# Patient Record
Sex: MALE | Race: WHITE | NOT HISPANIC OR LATINO | Employment: FULL TIME
[De-identification: names, ages, dates, MRNs, and addresses within clinical notes are randomized per-mention and may not be internally consistent; named-entity substitution may affect disease eponyms.]

---

## 2017-02-28 ENCOUNTER — RECORDS - HEALTHEAST (OUTPATIENT)
Dept: ADMINISTRATIVE | Facility: OTHER | Age: 37
End: 2017-02-28

## 2018-02-05 ENCOUNTER — COMMUNICATION - HEALTHEAST (OUTPATIENT)
Dept: TELEHEALTH | Facility: CLINIC | Age: 38
End: 2018-02-05

## 2018-02-05 ENCOUNTER — COMMUNICATION - HEALTHEAST (OUTPATIENT)
Dept: FAMILY MEDICINE | Facility: CLINIC | Age: 38
End: 2018-02-05

## 2018-02-05 ENCOUNTER — OFFICE VISIT - HEALTHEAST (OUTPATIENT)
Dept: FAMILY MEDICINE | Facility: CLINIC | Age: 38
End: 2018-02-05

## 2018-02-05 DIAGNOSIS — Z00.00 ANNUAL PHYSICAL EXAM: ICD-10-CM

## 2018-02-05 DIAGNOSIS — D22.9 NUMEROUS MOLES: ICD-10-CM

## 2018-02-05 DIAGNOSIS — Z76.89 ENCOUNTER TO ESTABLISH CARE: ICD-10-CM

## 2018-02-05 LAB
ALBUMIN SERPL-MCNC: 4 G/DL (ref 3.5–5)
ALP SERPL-CCNC: 76 U/L (ref 45–120)
ALT SERPL W P-5'-P-CCNC: 15 U/L (ref 0–45)
ANION GAP SERPL CALCULATED.3IONS-SCNC: 8 MMOL/L (ref 5–18)
AST SERPL W P-5'-P-CCNC: 18 U/L (ref 0–40)
BILIRUB SERPL-MCNC: 0.7 MG/DL (ref 0–1)
BUN SERPL-MCNC: 12 MG/DL (ref 8–22)
CALCIUM SERPL-MCNC: 9.4 MG/DL (ref 8.5–10.5)
CHLORIDE BLD-SCNC: 103 MMOL/L (ref 98–107)
CHOLEST SERPL-MCNC: 263 MG/DL
CO2 SERPL-SCNC: 29 MMOL/L (ref 22–31)
CREAT SERPL-MCNC: 0.8 MG/DL (ref 0.7–1.3)
FASTING STATUS PATIENT QL REPORTED: YES
GFR SERPL CREATININE-BSD FRML MDRD: >60 ML/MIN/1.73M2
GLUCOSE BLD-MCNC: 78 MG/DL (ref 70–125)
HDLC SERPL-MCNC: 57 MG/DL
LDLC SERPL CALC-MCNC: 186 MG/DL
POTASSIUM BLD-SCNC: 4.4 MMOL/L (ref 3.5–5)
PROT SERPL-MCNC: 7.5 G/DL (ref 6–8)
SODIUM SERPL-SCNC: 140 MMOL/L (ref 136–145)
TRIGL SERPL-MCNC: 98 MG/DL
TSH SERPL DL<=0.005 MIU/L-ACNC: 1.25 UIU/ML (ref 0.3–5)

## 2018-02-05 ASSESSMENT — MIFFLIN-ST. JEOR: SCORE: 1750.2

## 2018-10-10 ENCOUNTER — COMMUNICATION - HEALTHEAST (OUTPATIENT)
Dept: FAMILY MEDICINE | Facility: CLINIC | Age: 38
End: 2018-10-10

## 2018-10-11 ENCOUNTER — OFFICE VISIT - HEALTHEAST (OUTPATIENT)
Dept: FAMILY MEDICINE | Facility: CLINIC | Age: 38
End: 2018-10-11

## 2018-10-11 DIAGNOSIS — R07.89 ATYPICAL CHEST PAIN: ICD-10-CM

## 2018-10-11 LAB
ALBUMIN SERPL-MCNC: 4.4 G/DL (ref 3.5–5)
ALP SERPL-CCNC: 89 U/L (ref 45–120)
ALT SERPL W P-5'-P-CCNC: 24 U/L (ref 0–45)
ANION GAP SERPL CALCULATED.3IONS-SCNC: 11 MMOL/L (ref 5–18)
AST SERPL W P-5'-P-CCNC: 24 U/L (ref 0–40)
ATRIAL RATE - MUSE: 57 BPM
BILIRUB SERPL-MCNC: 1.3 MG/DL (ref 0–1)
BUN SERPL-MCNC: 9 MG/DL (ref 8–22)
CALCIUM SERPL-MCNC: 10.4 MG/DL (ref 8.5–10.5)
CHLORIDE BLD-SCNC: 102 MMOL/L (ref 98–107)
CO2 SERPL-SCNC: 27 MMOL/L (ref 22–31)
CREAT SERPL-MCNC: 0.91 MG/DL (ref 0.7–1.3)
DIASTOLIC BLOOD PRESSURE - MUSE: NORMAL MMHG
GFR SERPL CREATININE-BSD FRML MDRD: >60 ML/MIN/1.73M2
GLUCOSE BLD-MCNC: 90 MG/DL (ref 70–125)
INTERPRETATION ECG - MUSE: NORMAL
MAGNESIUM SERPL-MCNC: 2.4 MG/DL (ref 1.8–2.6)
P AXIS - MUSE: 11 DEGREES
POTASSIUM BLD-SCNC: 4.2 MMOL/L (ref 3.5–5)
PR INTERVAL - MUSE: 176 MS
PROT SERPL-MCNC: 7.8 G/DL (ref 6–8)
QRS DURATION - MUSE: 98 MS
QT - MUSE: 404 MS
QTC - MUSE: 393 MS
R AXIS - MUSE: 39 DEGREES
SODIUM SERPL-SCNC: 140 MMOL/L (ref 136–145)
SYSTOLIC BLOOD PRESSURE - MUSE: NORMAL MMHG
T AXIS - MUSE: 43 DEGREES
VENTRICULAR RATE- MUSE: 57 BPM

## 2018-10-12 ENCOUNTER — COMMUNICATION - HEALTHEAST (OUTPATIENT)
Dept: FAMILY MEDICINE | Facility: CLINIC | Age: 38
End: 2018-10-12

## 2018-10-12 LAB
25(OH)D3 SERPL-MCNC: 14.9 NG/ML (ref 30–80)
25(OH)D3 SERPL-MCNC: 14.9 NG/ML (ref 30–80)

## 2018-10-15 ENCOUNTER — AMBULATORY - HEALTHEAST (OUTPATIENT)
Dept: FAMILY MEDICINE | Facility: CLINIC | Age: 38
End: 2018-10-15

## 2018-10-15 DIAGNOSIS — E55.9 VITAMIN D INSUFFICIENCY: ICD-10-CM

## 2019-01-12 ENCOUNTER — COMMUNICATION - HEALTHEAST (OUTPATIENT)
Dept: FAMILY MEDICINE | Facility: CLINIC | Age: 39
End: 2019-01-12

## 2019-01-16 ENCOUNTER — OFFICE VISIT - HEALTHEAST (OUTPATIENT)
Dept: FAMILY MEDICINE | Facility: CLINIC | Age: 39
End: 2019-01-16

## 2019-01-16 DIAGNOSIS — F41.9 ANXIETY: ICD-10-CM

## 2019-01-16 DIAGNOSIS — Z00.00 HEALTHCARE MAINTENANCE: ICD-10-CM

## 2019-01-16 DIAGNOSIS — E55.9 VITAMIN D INSUFFICIENCY: ICD-10-CM

## 2019-01-16 LAB
CHOLEST SERPL-MCNC: 301 MG/DL
FASTING STATUS PATIENT QL REPORTED: YES
HDLC SERPL-MCNC: 52 MG/DL
LDLC SERPL CALC-MCNC: 232 MG/DL
TRIGL SERPL-MCNC: 84 MG/DL
TSH SERPL DL<=0.005 MIU/L-ACNC: 0.76 UIU/ML (ref 0.3–5)

## 2019-01-17 LAB
25(OH)D3 SERPL-MCNC: 33.6 NG/ML (ref 30–80)
25(OH)D3 SERPL-MCNC: 33.6 NG/ML (ref 30–80)

## 2019-03-12 ENCOUNTER — OFFICE VISIT - HEALTHEAST (OUTPATIENT)
Dept: FAMILY MEDICINE | Facility: CLINIC | Age: 39
End: 2019-03-12

## 2019-03-12 DIAGNOSIS — Z23 INFLUENZA VACCINE NEEDED: ICD-10-CM

## 2019-03-12 DIAGNOSIS — F41.9 ANXIETY: ICD-10-CM

## 2020-05-05 ENCOUNTER — RECORDS - HEALTHEAST (OUTPATIENT)
Dept: ADMINISTRATIVE | Facility: OTHER | Age: 40
End: 2020-05-05

## 2020-06-26 ENCOUNTER — RECORDS - HEALTHEAST (OUTPATIENT)
Dept: ADMINISTRATIVE | Facility: OTHER | Age: 40
End: 2020-06-26

## 2020-07-28 ENCOUNTER — OFFICE VISIT - HEALTHEAST (OUTPATIENT)
Dept: FAMILY MEDICINE | Facility: CLINIC | Age: 40
End: 2020-07-28

## 2020-07-28 DIAGNOSIS — Z00.00 ENCOUNTER FOR GENERAL ADULT MEDICAL EXAMINATION WITHOUT ABNORMAL FINDINGS: ICD-10-CM

## 2020-07-28 DIAGNOSIS — Z13.220 ENCOUNTER FOR SCREENING FOR LIPOID DISORDERS: ICD-10-CM

## 2020-07-28 LAB
ANION GAP SERPL CALCULATED.3IONS-SCNC: 12 MMOL/L (ref 5–18)
BUN SERPL-MCNC: 12 MG/DL (ref 8–22)
CALCIUM SERPL-MCNC: 9.6 MG/DL (ref 8.5–10.5)
CHLORIDE BLD-SCNC: 103 MMOL/L (ref 98–107)
CHOLEST SERPL-MCNC: 274 MG/DL
CO2 SERPL-SCNC: 25 MMOL/L (ref 22–31)
CREAT SERPL-MCNC: 0.91 MG/DL (ref 0.7–1.3)
FASTING STATUS PATIENT QL REPORTED: YES
GFR SERPL CREATININE-BSD FRML MDRD: >60 ML/MIN/1.73M2
GLUCOSE BLD-MCNC: 80 MG/DL (ref 70–125)
HDLC SERPL-MCNC: 60 MG/DL
HGB BLD-MCNC: 14.5 G/DL (ref 14–18)
HIV 1+2 AB+HIV1 P24 AG SERPL QL IA: NEGATIVE
LDLC SERPL CALC-MCNC: 198 MG/DL
POTASSIUM BLD-SCNC: 3.8 MMOL/L (ref 3.5–5)
SODIUM SERPL-SCNC: 140 MMOL/L (ref 136–145)
TRIGL SERPL-MCNC: 78 MG/DL

## 2020-07-28 ASSESSMENT — MIFFLIN-ST. JEOR: SCORE: 1747.7

## 2020-07-29 LAB
25(OH)D3 SERPL-MCNC: 26.7 NG/ML (ref 30–80)
25(OH)D3 SERPL-MCNC: 26.7 NG/ML (ref 30–80)

## 2020-11-03 ENCOUNTER — RECORDS - HEALTHEAST (OUTPATIENT)
Dept: ADMINISTRATIVE | Facility: OTHER | Age: 40
End: 2020-11-03

## 2021-05-31 VITALS — WEIGHT: 174 LBS | BODY MASS INDEX: 23.06 KG/M2 | HEIGHT: 73 IN

## 2021-06-01 ENCOUNTER — RECORDS - HEALTHEAST (OUTPATIENT)
Dept: ADMINISTRATIVE | Facility: CLINIC | Age: 41
End: 2021-06-01

## 2021-06-01 VITALS — BODY MASS INDEX: 23.54 KG/M2 | WEIGHT: 176 LBS

## 2021-06-02 ENCOUNTER — RECORDS - HEALTHEAST (OUTPATIENT)
Dept: ADMINISTRATIVE | Facility: CLINIC | Age: 41
End: 2021-06-02

## 2021-06-02 VITALS — BODY MASS INDEX: 23.2 KG/M2 | WEIGHT: 173.44 LBS

## 2021-06-02 VITALS — BODY MASS INDEX: 23.54 KG/M2 | WEIGHT: 176 LBS

## 2021-06-04 VITALS
HEART RATE: 58 BPM | DIASTOLIC BLOOD PRESSURE: 84 MMHG | OXYGEN SATURATION: 96 % | WEIGHT: 175.2 LBS | SYSTOLIC BLOOD PRESSURE: 128 MMHG | HEIGHT: 72 IN | BODY MASS INDEX: 23.73 KG/M2

## 2021-06-10 NOTE — PROGRESS NOTES
MALE PREVENTATIVE EXAM    Assessment and Plan:   1. Encounter for general adult medical examination without abnormal findings  Healthy male exam  - Lipid Cascade- RANDOM  - HIV Antigen/Antibody Screening Cook  - Basic Metabolic Panel  - Hemoglobin  - Vitamin D, Total (25-Hydroxy)  - Tdap vaccine greater than or equal to 6yo IM    2. Encounter for screening for lipoid disorders  - Lipid Cascade- RANDOM    Next follow up:  No follow-ups on file.    Immunization Review  Adult Imm Review: Due today, orders placed    I discussed the following with the patient:   Adult Healthy Living: Importance of regular exercise  Healthy nutrition  Getting adequate sleep  Stress management  Use of seat belts  Helmets  Sporting equipment safety  STI prevention  Contraception options  Supplement use  Herbal medications/alternative medical therapies    I have had an Advance Directives discussion with the patient.    Subjective:   Chief Complaint: Eyad Venegas is an 40 y.o. male here for a preventative health visit.     HPI: Patient has no concerns today.  He did have a stye on his right eye which he is taking antibiotic for and its resolving.  Patient denies chest pain, shortness of breath, fever chest pain.    Healthy Habits  Are you taking a daily aspirin? No  Do you typically exercising at least 40 min, 3-4 times per week?  NO  Do you usually eat at least 4 servings of fruit and vegetables a day, include whole grains and fiber and avoid regularly eating high fat foods? Yes  Have you had an eye exam in the past two years? Yes  Do you see a dentist twice per year? Yes  Do you have any concerns regarding sleep? No    Safety Screen  If you own firearms, are they secured in a locked gun cabinet or with trigger locks? The patient does not own any firearms  Do you feel you are safe where you are living?: Yes (7/28/2020  3:10 PM)  Do you feel you are safe in your relationship(s)?: Yes (7/28/2020  3:10 PM)      Review of Systems:   Please see above.  The rest of the review of systems are negative for all systems.     Cancer Screening     Patient has no health maintenance due at this time          Patient Care Team:  Tonya Lara FNP as PCP - General (Nurse Practitioner)  Tonya Lara FNP as Assigned PCP        History     Reviewed By Date/Time Sections Reviewed    Christo Sanders CMA 7/28/2020  3:13 PM Tobacco            Objective:   Vital Signs:   Visit Vitals  /84   Pulse (!) 58   Ht 6' (1.829 m)   Wt 175 lb 3.2 oz (79.5 kg)   SpO2 96%   BMI 23.76 kg/m           PHYSICAL EXAM  /84   Pulse (!) 58   Ht 6' (1.829 m)   Wt 175 lb 3.2 oz (79.5 kg)   SpO2 96%   BMI 23.76 kg/m    General appearance: alert, appears stated age and cooperative  Head: Normocephalic, without obvious abnormality, atraumatic  Eyes: conjunctivae/corneas clear. PERRL, EOM's intact. Fundi benign.  Ears: normal TM's and external ear canals both ears  Throat: lips, mucosa, and tongue normal; teeth and gums normal  Neck: no adenopathy, no carotid bruit, no JVD, supple, symmetrical, trachea midline and thyroid not enlarged, symmetric, no tenderness/mass/nodules  Back: symmetric, no curvature. ROM normal. No CVA tenderness.  Lungs: clear to auscultation bilaterally  Chest wall: no tenderness  Heart: regular rate and rhythm, S1, S2 normal, no murmur, click, rub or gallop  Abdomen: soft, non-tender; bowel sounds normal; no masses,  no organomegaly  Extremities: extremities normal, atraumatic, no cyanosis or edema  Pulses: 2+ and symmetric  Skin: Skin color, texture, turgor normal. No rashes or lesions  Lymph nodes: Cervical, supraclavicular, and axillary nodes normal.  Neurologic: Grossly normal             Medication List      as of July 28, 2020  3:48 PM     You have not been prescribed any medications.         Additional Screenings Completed Today:

## 2021-06-15 NOTE — PROGRESS NOTES
Assessment:   1. Encounter to establish care  2. Annual physical exam  Healthy male exam. Encourage patient to start a priority list in the morning and work from the most important and try to complete a task before starting the next one. Patient verbalized understanding and promised to give it a try. He was also concerned of his blood pressure. Blood pressure was normal today. Patient was encouraged to get a blood pressure machine at home and check his blood pressure twice daily and if blood pressure is elevated he should return to the clinic with result.   - Lipid [place] FASTING  - Comprehensive Metabolic Panel  - Thyroid Cascade    3. Numerous moles  Patient sees a dermatologist annual for skin check and mole removal.      Plan:   All questions answered.  Blood tests: Comprehensive metabolic panel and Total cholesterol.  Discussed healthy lifestyle modifications.  Follow up as needed.     Subjective:   Eyad Venegas is a 37 y.o. male who presents for an annual exam. The patient reports that there is not domestic violence in his life. 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 He reports that he starts some of the task but he is unable to finish it as he moves to different things. He states that this was not like this before so he thought he was loosing his mind or if something was happening to his memory. He denied any other symptoms. Both his recent and past memories were intact.     Healthy Habits:   Regular Exercise: Yes  Sunscreen Use: Yes  Healthy Diet: Yes  Dental Visits Regularly: No  Seat Belt: Yes  Sexually active: Yes  Monthly Self Testicular Exams:  No  Hemoccults: N/A  Flex Sig: N/A  Colonoscopy: N/A  Lipid Profile: N/A  Glucose Screen: N/A  Prevention of Osteoporosis: N/A  Last Dexa: N/A  Guns at Home:  No      Immunization History   Administered Date(s) Administered     Hep A, historic 10/27/2014     Influenza,  "seasonal,quad inj 6-35 mos 10/27/2014     Td,adult,historic,unspecified 11/30/2005     Tdap 12/01/2009     Typhoid, historic, Unspecified 10/27/2014     Immunization status: up to date and documented, stated as current, but no records available.    No exam data present    No current outpatient prescriptions on file.     No current facility-administered medications for this visit.      No past medical history on file.  Past Surgical History:   Procedure Laterality Date     WI EXPLORE UNDESC TESTIS,INGUIN/SCROTAL      Description: Surgery Testis Exploration Of Undescended Testis;  Recorded: 07/27/2010;  Comments: thinks R-when very young     Review of patient's allergies indicates no known allergies.  No family history on file.  Social History     Social History     Marital status: Single     Spouse name: N/A     Number of children: N/A     Years of education: N/A     Occupational History     Not on file.     Social History Main Topics     Smoking status: Never Smoker     Smokeless tobacco: Never Used     Alcohol use Not on file     Drug use: Not on file     Sexual activity: Not on file     Other Topics Concern     Not on file     Social History Narrative       Review of Systems  General:  Denies problem  Eyes: Denies problem  Ears/Nose/Throat: Denies problem  Cardiovascular: Denies problem  Respiratory:  Denies problem  Gastrointestinal:  Denies problem  Genitourinary: Denies problem  Musculoskeletal:  Denies problem  Skin: Denies problem  Neurologic: Denies problem  Psychiatric: Denies problem  Endocrine: Denies problem  Heme/Lymphatic: Denies problem   Allergic/Immunologic: Denies problem        Objective:     Vitals:    02/05/18 0827   BP: 118/76   Pulse: 74   SpO2: 96%   Weight: 174 lb (78.9 kg)   Height: 6' 0.5\" (1.842 m)     Body mass index is 23.27 kg/(m^2).    Physical  General Appearance: Alert, cooperative, no distress, appears stated age  Head: Normocephalic, without obvious abnormality, atraumatic  Eyes: " PERRL, conjunctiva/corneas clear, EOM's intact  Ears: Normal TM's and external ear canals, both ears  Nose: Nares normal, septum midline,mucosa normal, no drainage  Throat: Lips, mucosa, and tongue normal; teeth and gums normal  Neck: Supple, symmetrical, trachea midline, no adenopathy;  thyroid: not enlarged, symmetric, no tenderness/mass/nodules; no carotid bruit or JVD  Back: Symmetric, no curvature, ROM normal, no CVA tenderness  Lungs: Clear to auscultation bilaterally, respirations unlabored  Heart: Regular rate and rhythm, S1 and S2 normal, no murmur, rub, or gallop,  Abdomen: Soft, non-tender, bowel sounds active all four quadrants,  no masses, no organomegaly  Musculoskeletal: Normal range of motion. No joint swelling or deformity.   Extremities: Extremities normal, atraumatic, no cyanosis or edema  Skin: Skin color, texture, turgor normal, no rashes or lesions  Lymph nodes: Cervical, supraclavicular, and axillary nodes normal  Neurologic: He is alert. He has normal reflexes.   Psychiatric: He has a normal mood and affect.

## 2021-06-18 NOTE — PATIENT INSTRUCTIONS - HE
Patient Instructions by Tonya Lara FNP at 7/28/2020  3:20 PM     Author: Tonya Lara FNP Service: -- Author Type: Nurse Practitioner    Filed: 7/28/2020  3:35 PM Encounter Date: 7/28/2020 Status: Signed    : Tonya Lara FNP (Nurse Practitioner)           Preventing Skin Cancer     Use sunscreen of SPF 30 or greater. Apply liberally.   Relaxing in the sun may feel good. But it isnt good for your skin. In fact, the suns harmful rays are the major cause of skin cancer. This is a serious disease that can be life-threatening. People of all ages, races, and backgrounds are at risk.  Skin cancer is the most common cancer in the [place] But in most cases, it can be prevented.  Your role in prevention  You can act today to help prevent skin cancer. Start by avoiding the suns UV (ultraviolet) rays. And dont use tanning beds or lamps. They are no safer than the sun. Taking these steps can help keep you from getting skin cancer. It can also help prevent wrinkles and other aging effects caused by the sun. Make sure your children also follow these safeguards. Now is the time to start taking steps to prevent skin cancer.  When you are outdoors  Protect your skin when you go out during the day. Take safety steps whenever you go out to eat, run errands by car or on foot, or do any outdoor activity. There isnt just one easy way to protect your skin. Its best to follow all of these steps:    Wear tightly woven clothing that covers your skin. Put on a wide-brimmed hat to protect your face, ears, and scalp.    Watch the clock. Try to stay out of the sun between 10 a.m. and 4 p.m. That's when the sun's rays are strongest.    Head for the shade or create your own. Use an umbrella when sitting or strolling.    Know that the suns rays can reflect off sand, water, and snow. This can harm your skin. Take extra care when you are near reflective surfaces.    Keep in mind that even when the weather is hazy or cloudy,  "your skin can be exposed to strong UV rays.    Shield your skin with sunscreen. Also use sunscreen on your childrens skin. Keep babies younger than 6 months old out of the sun.  Tips for using sunscreen  To help prevent skin cancer, choose the right sunscreen and use it correctly. Try these tips:    Choose a sunscreen that has an SPF (sun protection factor) of at least 30. Also choose a sunscreen labeled \"broad spectrum. This will protect you from both UVA and UVB (ultraviolet A and B) rays.    If one brand irritates your skin, try another, such as one without fragrance.    Use a water-resistant sunscreen if you swim or sweat.    Use at least 1 ounce of sunscreen to cover exposed areas. This is enough to fill a shot glass. You might need to adjust the amount depending on your body size.    Put the sunscreen on dry skin about 15 minutes before going outdoors. This gives it time to soak in.    Reapply sunscreen every 2 hours. If youre active, do this more often.    Cover any sun-exposed skin, from your face to your feet. Dont forget your scalp, ears, and lips.    Know that while sunscreen helps protect you, it isnt enough. Sunscreens extend the length of time you can be outdoors before your skin starts to get red. But they don't give you total protection. Using sunscreen doesn't mean you can stay out in the sun for an unlimited time. Your skin cells are still being damaged. You should also wear protective clothing. And try to stay out of the sun as much as you can, especially from 10 a.m. to 4 p.m.  Date Last Reviewed: 7/1/2019 2000-2019 [employer]. [address] [place] [address], [place] [address]. All rights reserved. This information is not intended as a substitute for professional medical care. Always follow your healthcare professional's instructions.             "

## 2021-06-20 NOTE — PROGRESS NOTES
Assessment:   1. Atypical chest pain  I suspect Costochondritis. I recommend that patient stop weight lifting, use hot compresses and follow up in 2 weeks. We will get EKG to rule out cardiac etiology.   - Electrocardiogram Perform and Read  - Comprehensive Metabolic Panel  - Magnesium  - Vitamin D, Total (25-Hydroxy)    Plan:   Chest wall injuries were discussed.  The sometimes prolonged recovery time was stressed.  OTC analgesics as needed.  Follow up as needed     Subjective:   Eyad Venegas is a 38 y.o. male who presents for evaluation of chest wall pain. Onset was 4 weeks ago. Symptoms have been unchanged since that time. The patient describes the pain as sharp and shooting in the costochondral region:  on the left and anterior chest wall: on the left. Patient rates pain as a 3/10 in intensity. Associated symptoms are: none. Aggravating factors are: none. Alleviating factors are: none. Mechanism of injury: none known. Previous visits for this problem: none. Evaluation to date: none. Treatment to date: none.    The following portions of the patient's history were reviewed and updated as appropriate: allergies, current medications, past family history, past medical history, past social history, past surgical history and problem list.    Review of Systems  Pertinent items are noted in HPI.     Objective:      /82 (Patient Site: Left Arm, Patient Position: Sitting, Cuff Size: Adult Regular)  Pulse 80  Wt 176 lb (79.8 kg)  SpO2 98%  BMI 23.54 kg/m2  General appearance: alert, appears stated age and cooperative  Head: Normocephalic, without obvious abnormality, atraumatic  Throat: lips, mucosa, and tongue normal; teeth and gums normal  Neck: no adenopathy, no carotid bruit, no JVD, supple, symmetrical, trachea midline and thyroid not enlarged, symmetric, no tenderness/mass/nodules  Lungs: clear to auscultation bilaterally  Chest wall: no tenderness, left sided chest wall tenderness  Heart: regular  rate and rhythm, S1, S2 normal, no murmur, click, rub or gallop  Pulses: 2+ and symmetric  Skin: Skin color, texture, turgor normal. No rashes or lesions  Lymph nodes: Cervical, supraclavicular, and axillary nodes normal.  Neurologic: Grossly normal    EKG: Sinus bradycardia.

## 2021-06-23 NOTE — TELEPHONE ENCOUNTER
RN cannot approve Refill Request    RN can NOT refill this medication med is not covered by policy/route to provider. Last office visit: 10/11/2018 Tonya Lara FNP Last Physical: 2/5/2018 Last MTM visit: Visit date not found Last visit same specialty: 10/11/2018 Tonya aLra FNP.  Next visit within 3 mo: Visit date not found  Next physical within 3 mo: Visit date not found      Silvina Shipman, [facility] Triage/Med Refill 1/13/2019    Requested Prescriptions   Pending Prescriptions Disp Refills     ergocalciferol (ERGOCALCIFEROL) 50,000 unit capsule [Pharmacy Med Name: VIT D2 1.25 MG (50,000 UNIT)] 12 capsule 0     Sig: TAKE ONE CAPSULE BY MOUTH ONE TIME PER WEEK    There is no refill protocol information for this order

## 2021-06-23 NOTE — PROGRESS NOTES
Office Visit - Follow Up   Eyad Venegas   38 y.o. male    Date of Visit: 1/16/2019    Chief Complaint   Patient presents with     Follow-up     fasting        Assessment and Plan   1. Vitamin D insufficiency  Plan to recheck vitamin D levels today and reassess.  - Vitamin D, Total (25-Hydroxy)    2. Anxiety  PHQ 9 score of 10 and KALI 7 score of 16.  Discussed the pathophysiology of anxiety and depression and available treatment including counseling and pharmacology.  Patient opted for psychotherapy at this time and will follow-up in 4 weeks for reassessment.  Medications: none.  Labs: Comprehensive metabolic profile and TSH.  Recommended counseling.  Reviewed concept of anxiety as biochemical imbalance of neurotransmitters and rationale for treatment.  Instructed patient to contact office or on-call physician promptly should condition worsen or any new symptoms appear and provided on-call telephone numbers. IF THE PATIENT HAS ANY SUICIDAL OR HOMICIDAL IDEATIONS, CALL THE OFFICE, DISCUSS WITH A SUPPORT MEMBER, OR GO TO THE ER IMMEDIATELY. Patient was agreeable with this plan.  Follow up: 4 weeks.  Spent 25 minutes (>50% of visit) discussing the risks of anxiety disorder, the  pathophysiology, etiology, risks, and principles of treatment.     - Ambulatory referral to Psychology    3. Healthcare maintenance  - Thyroid Stimulating Hormone (TSH)  - Lipid Cascade    The following high BMI interventions were performed this visit: encouragement to exercise and dietary management education, guidance, and counseling    No Follow-up on file.     History of Present Illness   This 38 y.o. old male patient who presents to the clinic for follow-up of his vitamin D level.  Patient also reported that he is noticed that he gets very anxious lately and he is not sure if its because he is unable to complete his tasks at home and at work. Current symptoms: difficulty concentrating, irritable, palpitations, sweating. He denies  current suicidal and homicidal ideation. He did report family history of anxiety.  He denies any suicidal homicidal ideation.     Review of Systems: A comprehensive review of systems was negative except as noted.     Medications, Allergies and Problem List   Reviewed and updated     Physical Exam   General Appearance: Well groomed    /76 (Patient Site: Right Arm, Patient Position: Sitting, Cuff Size: Adult Regular)   Pulse (!) 56   Wt 176 lb (79.8 kg)   SpO2 96%   BMI 23.54 kg/m     Psych: appropriate affective, answers all of my questions appropriately. No hallucinations, delusion, or suicidal ideations.  Affect/Behavior:  full facial expressions, good grooming, good insight, normal perception, normal reasoning, normal speech pattern and content and normal thought patterns           Additional Information   Current Outpatient Medications   Medication Sig Dispense Refill     ergocalciferol (VITAMIN D2) 50,000 unit capsule Take 1 capsule (50,000 Units total) by mouth every 7 days. 12 capsule 0     No current facility-administered medications for this visit.      No Known Allergies  Social History     Tobacco Use     Smoking status: Never Smoker     Smokeless tobacco: Never Used   Substance Use Topics     Alcohol use: Yes     Alcohol/week: 3.0 oz     Types: 5 Cans of beer per week     Drug use: Yes     Types: Marijuana     Comment: Once a month       Time: total time spent with the patient was 25 minutes of which >50% was spent in counseling and coordination of care     Tonya Lara, CNP

## 2021-06-24 NOTE — PROGRESS NOTES
Assessment:   1. Anxiety  Improved symptoms with adjustment at work at home.  Patient will continue to work on his symptoms and hopefully establish with a psychologist for therapy.    2. Influenza vaccine needed  Complete needed vaccination.     Plan:   Medications: None.  Reviewed concept of anxiety as biochemical imbalance of neurotransmitters and rationale for treatment.  Instructed patient to contact office or on-call physician promptly should condition worsen or any new symptoms appear and provided on-call telephone numbers. IF THE PATIENT HAS ANY SUICIDAL OR HOMICIDAL IDEATIONS, CALL THE OFFICE, DISCUSS WITH A SUPPORT MEMBER, OR GO TO THE ER IMMEDIATELY. Patient was agreeable with this plan.  Follow up: 3 months.  Spent 15 minutes (>50% of visit) discussing the risks of anxiety disorder, the  pathophysiology, etiology, risks, and principles of treatment.     Subjective:   Eyad MORALES Rikgary is a 38 y.o. male who presents for follow up of anxiety disorder.  Patient reported that he has been doing better.  He reported that he made some adjustment at his work and that it has really helped with his anxiety.  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  He stated that he is expecting a baby in October and he is feeling much better about the whole situation.  He is yet to establish with a psychologist for therapy.  Current symptoms: feelings of losing control. He denies current suicidal and homicidal ideation.     The following portions of the patient's history were reviewed and updated as appropriate: allergies, current medications, past family history, past medical history, past social history, past surgical history and problem list.      Objective:      /66   Pulse (!) 55   Wt 173 lb 7 oz (78.7 kg)   SpO2 99%   BMI 23.20 kg/m     General:  alert, appears stated age and cooperative    Affect/Behavior:  full facial expressions, good grooming, good insight, normal perception, normal reasoning, normal speech pattern and content and normal thought patterns

## 2021-08-06 ENCOUNTER — TRANSFERRED RECORDS (OUTPATIENT)
Dept: HEALTH INFORMATION MANAGEMENT | Facility: CLINIC | Age: 41
End: 2021-08-06

## 2021-09-19 ENCOUNTER — HEALTH MAINTENANCE LETTER (OUTPATIENT)
Age: 41
End: 2021-09-19

## 2021-12-08 ENCOUNTER — NURSE TRIAGE (OUTPATIENT)
Dept: NURSING | Facility: CLINIC | Age: 41
End: 2021-12-08
Payer: COMMERCIAL

## 2021-12-09 ENCOUNTER — OFFICE VISIT (OUTPATIENT)
Dept: FAMILY MEDICINE | Facility: CLINIC | Age: 41
End: 2021-12-09
Payer: COMMERCIAL

## 2021-12-09 VITALS
BODY MASS INDEX: 24.82 KG/M2 | OXYGEN SATURATION: 98 % | HEART RATE: 89 BPM | SYSTOLIC BLOOD PRESSURE: 130 MMHG | WEIGHT: 183 LBS | DIASTOLIC BLOOD PRESSURE: 86 MMHG

## 2021-12-09 DIAGNOSIS — R03.0 ELEVATED BLOOD PRESSURE READING WITHOUT DIAGNOSIS OF HYPERTENSION: Primary | ICD-10-CM

## 2021-12-09 LAB
ALBUMIN SERPL-MCNC: 3.8 G/DL (ref 3.4–5)
ALP SERPL-CCNC: 78 U/L (ref 40–150)
ALT SERPL W P-5'-P-CCNC: 32 U/L (ref 0–70)
ANION GAP SERPL CALCULATED.3IONS-SCNC: 5 MMOL/L (ref 3–14)
AST SERPL W P-5'-P-CCNC: 22 U/L (ref 0–45)
BILIRUB SERPL-MCNC: 0.6 MG/DL (ref 0.2–1.3)
BUN SERPL-MCNC: 15 MG/DL (ref 7–30)
CALCIUM SERPL-MCNC: 9.5 MG/DL (ref 8.5–10.1)
CHLORIDE BLD-SCNC: 103 MMOL/L (ref 94–109)
CO2 SERPL-SCNC: 28 MMOL/L (ref 20–32)
CREAT SERPL-MCNC: 1.01 MG/DL (ref 0.66–1.25)
ERYTHROCYTE [DISTWIDTH] IN BLOOD BY AUTOMATED COUNT: 12 % (ref 10–15)
GFR SERPL CREATININE-BSD FRML MDRD: >90 ML/MIN/1.73M2
GLUCOSE BLD-MCNC: 95 MG/DL (ref 70–99)
HCT VFR BLD AUTO: 41.5 % (ref 40–53)
HGB BLD-MCNC: 14.3 G/DL (ref 13.3–17.7)
MCH RBC QN AUTO: 30.6 PG (ref 26.5–33)
MCHC RBC AUTO-ENTMCNC: 34.5 G/DL (ref 31.5–36.5)
MCV RBC AUTO: 89 FL (ref 78–100)
PLATELET # BLD AUTO: 360 10E3/UL (ref 150–450)
POTASSIUM BLD-SCNC: 3.9 MMOL/L (ref 3.4–5.3)
PROT SERPL-MCNC: 8.4 G/DL (ref 6.8–8.8)
RBC # BLD AUTO: 4.68 10E6/UL (ref 4.4–5.9)
SODIUM SERPL-SCNC: 136 MMOL/L (ref 133–144)
TSH SERPL DL<=0.005 MIU/L-ACNC: 1.03 MU/L (ref 0.4–4)
WBC # BLD AUTO: 5.1 10E3/UL (ref 4–11)

## 2021-12-09 PROCEDURE — 80053 COMPREHEN METABOLIC PANEL: CPT | Performed by: STUDENT IN AN ORGANIZED HEALTH CARE EDUCATION/TRAINING PROGRAM

## 2021-12-09 PROCEDURE — 84443 ASSAY THYROID STIM HORMONE: CPT | Performed by: STUDENT IN AN ORGANIZED HEALTH CARE EDUCATION/TRAINING PROGRAM

## 2021-12-09 PROCEDURE — 99203 OFFICE O/P NEW LOW 30 MIN: CPT | Performed by: STUDENT IN AN ORGANIZED HEALTH CARE EDUCATION/TRAINING PROGRAM

## 2021-12-09 PROCEDURE — 80061 LIPID PANEL: CPT | Performed by: STUDENT IN AN ORGANIZED HEALTH CARE EDUCATION/TRAINING PROGRAM

## 2021-12-09 PROCEDURE — 82043 UR ALBUMIN QUANTITATIVE: CPT | Performed by: STUDENT IN AN ORGANIZED HEALTH CARE EDUCATION/TRAINING PROGRAM

## 2021-12-09 PROCEDURE — 36415 COLL VENOUS BLD VENIPUNCTURE: CPT | Performed by: STUDENT IN AN ORGANIZED HEALTH CARE EDUCATION/TRAINING PROGRAM

## 2021-12-09 PROCEDURE — 85027 COMPLETE CBC AUTOMATED: CPT | Performed by: STUDENT IN AN ORGANIZED HEALTH CARE EDUCATION/TRAINING PROGRAM

## 2021-12-09 NOTE — PATIENT INSTRUCTIONS
[email or web address]    Check home blood pressures. Check twice per day at various times during the day. Check after resting for 5-10 minutes.     BP stages (according to ACC):  <120 and <80 normal  120-130 and <80 is elevated  130-140 or 80-90 is Stage I HTN, treatment is lifestyle changes  >140 of >90 is stage II  Classify it into the stage of the number that is higher.    BP guidelines (according to AAFP/JNC8)  For adults >age 60, goal BP is <150/90  For adults < 60, goa BP <140/90  For adults age 18-70 with CKD, goal BP <140/90  For adults of all ages with DM, goal BP<140/90

## 2021-12-09 NOTE — PROGRESS NOTES
Assessment & Plan     Elevated blood pressure reading without diagnosis of hypertension  Due for routine annual labs. He has had elevated BPs in clinical settings without diagnosis of HTN. We discussed cut off values for HTN and that I would like him to check BPs at home so we can get a better idea of his baseline values. DME for cuff provided. He will follow up if pressures are consistently elevated and will work on lifestyle changes. I will be in touch with his lab results.  - Comprehensive metabolic panel (BMP + Alb, Alk Phos, ALT, AST, Total. Bili, TP)  - CBC with platelets  - TSH with free T4 reflex  - Albumin Random Urine Quantitative with Creat Ratio  - Lipid panel reflex to direct LDL Fasting  - Home Blood Pressure Monitor Order for DME - ONLY FOR DME  - Comprehensive metabolic panel (BMP + Alb, Alk Phos, ALT, AST, Total. Bili, TP)  - CBC with platelets  - TSH with free T4 reflex  - Albumin Random Urine Quantitative with Creat Ratio  - Lipid panel reflex to direct LDL Fasting    Martha Sunshine MD  [facility]   Eyad Torres is a 41 year old with PMH of BCC who presents for the following health issues    History of Present Illness       Hypertension: He presents for follow up of hypertension.  He does not check blood pressure  regularly outside of the clinic. Outside blood pressures have been over 140/90. He does not follow a low salt diet.     He eats 2-3 servings of fruits and vegetables daily.He consumes 1 sweetened beverage(s) daily.He exercises with enough effort to increase his heart rate 9 or less minutes per day.  He exercises with enough effort to increase his heart rate 3 or less days per week.   He is taking medications regularly.     Here for blood pressure concern.    He tells me that his BP has always been elevated. Diastolics as high as 90 since his 20s. His last physical, his BP was 128/86. He had a good diet, regular exercise at that time. In Feb  2020, he had a child and then pandemic started. Had lost his good lifestyle habits.     12 days ago, he went to [facility] and was diagnosed with a sinus infection and given amoxicillin. His Bp was 140/90. Yesterday, his BP was 138/87. Does not have a home blood pressure cuff. Notes that the electronic cuffs read higher than the manual.     Has never smoked. Has 5-6 drinks per week. No structured exercise currently. No blurry vision, chest pain or dyspnea with activity, leg swelling. No issues with loud snoring, cessation of breathing during sleep, wakes refreshed.     Family history - mother with HTN, father with HLD.          Objective    /86   Pulse 89   Wt 83 kg (183 lb)   SpO2 98%   BMI 24.82 kg/m    Body mass index is 24.82 kg/m .  Physical Exam   GENERAL: healthy, alert and no distress  EYES: Eyes grossly normal to inspection, PERRL and conjunctivae and sclerae normal  HENT: ear canals and TM's normal, nose and mouth without ulcers or lesions  NECK: no adenopathy, no asymmetry, masses, or scars and thyroid normal to palpation  RESP: lungs clear to auscultation - no rales, rhonchi or wheezes  CV: regular rate and rhythm, normal S1 S2, no S3 or S4, no murmur, click or rub, no peripheral edema and peripheral pulses strong  ABDOMEN: soft, nontender, no hepatosplenomegaly, no masses and bowel sounds normal  MS: no gross musculoskeletal defects noted, no edema  SKIN: no suspicious lesions or rashes  NEURO: Normal strength and tone, mentation intact and speech normal  PSYCH: mentation appears normal, affect normal/bright    Labs in process.

## 2021-12-10 NOTE — RESULT ENCOUNTER NOTE
Hilario Castano,    Your thyroid, kidney and liver, and hemoglobin testing is normal. Cholesterol is still in process. Will let you know when that returns.    Martha Sunshine MD  [facility]  [phone]

## 2021-12-12 LAB
CREAT UR-MCNC: 269 MG/DL
MICROALBUMIN UR-MCNC: 10 MG/L
MICROALBUMIN/CREAT UR: 3.72 MG/G CR (ref 0–17)

## 2021-12-13 NOTE — RESULT ENCOUNTER NOTE
Your urine test is normal.    Martha Sunshine MD  [facility]  [phone]

## 2021-12-17 LAB
CHOLEST SERPL-MCNC: 232 MG/DL
FASTING STATUS PATIENT QL REPORTED: ABNORMAL
HDLC SERPL-MCNC: 45 MG/DL
LDLC SERPL CALC-MCNC: 171 MG/DL
NONHDLC SERPL-MCNC: 187 MG/DL
TRIGL SERPL-MCNC: 82 MG/DL

## 2021-12-17 NOTE — RESULT ENCOUNTER NOTE
Hilario Castano,    Your cholesterol level is above goal. High cholesterol--among other lifestyle factors--increases the risk of heart disease and stroke. You can help lower your cholesterol through lifestyle changes and specifically healthy nutrition and physical activity. A mediterranean style diet has been proven to lower cholesterol. This includes a focus on vegetables, fresh fruits, olive oil, legumes, low mercury fish, lean protein, low fat dairy and whole grains. See my specific dietary recommendations below. It is also recommended to get at least 150 minutes of moderate intensity exercise per week including strength training. I would recommend rechecking your cholesterol in 1 year.     Martha Sunshine MD    Foods to eat and what to avoid/minimize to lower cholesterol:      Foods to avoid:  -Processed foods (eg. frozen meals, pre packaged foods)  -Sucrose and fructose (check the ingredients)  -Refined carbohydrates (eg. white flour bread and crackers, pasta)  -Fast food  -Too much animal fat (red meat, excessive butter or dairy)  -Food or drink in plastic containers  -Soda, fruit juice, sweetened beverages  -[place] large meals  -High sugar fruits (eg. glenn, pineapple, watermelon, grapes, cherries, bananas)     Foods to eat:  -Unlimited colorful vegetables  -Unlimited leafy greens  -Polyunsaturated and monounsaturated fats (see below)  -Olive oil  -Lean meat (turkey, chicken)  -Fish and seafood (eg. Sardines, salmon, mackerel)  -Whole grains (eg. Quinoa, oatmeal, buckwheat, brown rice, barley, rye, spelt)  -Avocados  -Unsalted nuts and seeds, raw when possible  -Eggs in moderation  -Berries, omero, limes  -Cinnamon   -Drink water, herbal tea, green tea    Healthiest sources of fats: olives, olive oil, avocado and avocado oil, canola oil, almonds, cashews, hazelnuts, macadamia nuts, peanuts, pecans, pistachios, flax seeds...    Tips: Cook with avocado oil. Add olive oil AFTER you cook foods to preserve the  nutrients. When cooking a meal, make extra to eat the next day. Plan your meals ahead of time.

## 2022-11-21 ENCOUNTER — HEALTH MAINTENANCE LETTER (OUTPATIENT)
Age: 42
End: 2022-11-21

## 2023-03-02 ENCOUNTER — TRANSFERRED RECORDS (OUTPATIENT)
Dept: HEALTH INFORMATION MANAGEMENT | Facility: CLINIC | Age: 43
End: 2023-03-02

## 2023-09-24 ENCOUNTER — OFFICE VISIT (OUTPATIENT)
Dept: URGENT CARE | Facility: URGENT CARE | Age: 43
End: 2023-09-24
Payer: COMMERCIAL

## 2023-09-24 VITALS
SYSTOLIC BLOOD PRESSURE: 167 MMHG | TEMPERATURE: 98.4 F | HEART RATE: 72 BPM | WEIGHT: 178.2 LBS | DIASTOLIC BLOOD PRESSURE: 84 MMHG | RESPIRATION RATE: 20 BRPM | BODY MASS INDEX: 24.17 KG/M2 | OXYGEN SATURATION: 100 %

## 2023-09-24 DIAGNOSIS — N48.9 LESION OF PENIS: Primary | ICD-10-CM

## 2023-09-24 LAB
ALBUMIN UR-MCNC: NEGATIVE MG/DL
APPEARANCE UR: CLEAR
BILIRUB UR QL STRIP: NEGATIVE
COLOR UR AUTO: YELLOW
GLUCOSE UR STRIP-MCNC: NEGATIVE MG/DL
HGB UR QL STRIP: ABNORMAL
KETONES UR STRIP-MCNC: NEGATIVE MG/DL
LEUKOCYTE ESTERASE UR QL STRIP: NEGATIVE
NITRATE UR QL: NEGATIVE
PH UR STRIP: 5.5 [PH] (ref 5–7)
RBC #/AREA URNS AUTO: NORMAL /HPF
SP GR UR STRIP: <=1.005 (ref 1–1.03)
UROBILINOGEN UR STRIP-ACNC: 0.2 E.U./DL
WBC #/AREA URNS AUTO: NORMAL /HPF

## 2023-09-24 PROCEDURE — 86780 TREPONEMA PALLIDUM: CPT | Performed by: PHYSICIAN ASSISTANT

## 2023-09-24 PROCEDURE — 86696 HERPES SIMPLEX TYPE 2 TEST: CPT | Performed by: PHYSICIAN ASSISTANT

## 2023-09-24 PROCEDURE — 87591 N.GONORRHOEAE DNA AMP PROB: CPT | Performed by: PHYSICIAN ASSISTANT

## 2023-09-24 PROCEDURE — 99213 OFFICE O/P EST LOW 20 MIN: CPT | Performed by: PHYSICIAN ASSISTANT

## 2023-09-24 PROCEDURE — 86695 HERPES SIMPLEX TYPE 1 TEST: CPT | Performed by: PHYSICIAN ASSISTANT

## 2023-09-24 PROCEDURE — 87491 CHLMYD TRACH DNA AMP PROBE: CPT | Performed by: PHYSICIAN ASSISTANT

## 2023-09-24 PROCEDURE — 81001 URINALYSIS AUTO W/SCOPE: CPT | Performed by: PHYSICIAN ASSISTANT

## 2023-09-24 PROCEDURE — 36415 COLL VENOUS BLD VENIPUNCTURE: CPT | Performed by: PHYSICIAN ASSISTANT

## 2023-09-24 NOTE — PROGRESS NOTES
Lesion of penis  - UA Macroscopic with reflex to Microscopic and Culture - Clinic Collect  - Chlamydia trachomatis/Neisseria gonorrhoeae by PCR - Clinic Collect  - UA Microscopic with Reflex to Culture  - Treponema Abs w Reflex to RPR and Titer  - Herpes Simplex Virus 1 and 2 IgG    Patient offered reassurance as lesion on penis seems to be a folliculitis and is self limited. Awaiting STI results. Follow up with PCP in 1 week if not resolved.      JASKARAN Vela [facility]    Subjective   43 year old who presents to clinic today for the following health issues:    Dysuria       HPI     Genitourinary - Male  Onset/Duration: At the tip started today- possible exposure to STI   Description:   Dysuria (painful urination): mild}  Hematuria (blood in urine): No  Frequency: No  Waking at night to urinate: No  Hesitancy (delay in urine): No  Retention (unable to empty): No  Decrease in urinary flow: No  Incontinence: No  Progression of Symptoms:  same  Accompanying Signs & Symptoms:  Fever: No  Back/Flank pain: No  Urethral discharge: No  Testicle lumps/masses/pain: No  Nausea and/or vomiting: No  Abdominal pain: No  History:   History of frequent UTI s: No  History of kidney stones: No  History of hernias: No  Personal or Family history of Prostate problems: No  Sexually active: YES- Patient has had sex without a condom   Therapies tried and outcome: none   Patient has never tested positive for any STDs.     Review of Systems   Review of Systems   See HPI    Objective    Temp: 98.4  F (36.9  C) Temp src: Temporal BP: (!) 167/84 Pulse: 72   Resp: 20 SpO2: 100 %       Physical Exam   Physical Exam  Constitutional:       General: He is not in acute distress.     Appearance: Normal appearance. He is normal weight. He is not ill-appearing, toxic-appearing or diaphoretic.   HENT:      Head: Normocephalic and atraumatic.   Cardiovascular:      Rate and Rhythm: Normal rate.      Pulses: Normal pulses.    Pulmonary:      Effort: Pulmonary effort is normal. No respiratory distress.   Genitourinary:     Comments: Patient has a small papule on the underside of the penis on the shaft. It is non-tender, non-bleeding and without purulent discharge or bleeding. Exam is otherwise normal.   Neurological:      General: No focal deficit present.      Mental Status: He is alert and oriented to person, place, and time. Mental status is at baseline.      Gait: Gait normal.   Psychiatric:         Mood and Affect: Mood normal.         Behavior: Behavior normal.         Thought Content: Thought content normal.         Judgment: Judgment normal.          Results for orders placed or performed in visit on 09/24/23 (from the past 24 hour(s))   UA Macroscopic with reflex to Microscopic and Culture - Clinic Collect    Specimen: Urine, Clean Catch   Result Value Ref Range    Color Urine Yellow Colorless, Straw, Light Yellow, Yellow    Appearance Urine Clear Clear    Glucose Urine Negative Negative mg/dL    Bilirubin Urine Negative Negative    Ketones Urine Negative Negative mg/dL    Specific Gravity Urine <=1.005 1.003 - 1.035    Blood Urine Trace (A) Negative    pH Urine 5.5 5.0 - 7.0    Protein Albumin Urine Negative Negative mg/dL    Urobilinogen Urine 0.2 0.2, 1.0 E.U./dL    Nitrite Urine Negative Negative    Leukocyte Esterase Urine Negative Negative   UA Microscopic with Reflex to Culture   Result Value Ref Range    RBC Urine None Seen 0-2 /HPF /HPF    WBC Urine None Seen 0-5 /HPF /HPF    Narrative    Urine Culture not indicated

## 2023-09-25 LAB
HSV1 IGG SERPL QL IA: 0.19 INDEX
HSV1 IGG SERPL QL IA: NORMAL
HSV2 IGG SERPL QL IA: 0.31 INDEX
HSV2 IGG SERPL QL IA: NORMAL
T PALLIDUM AB SER QL: NONREACTIVE

## 2023-09-26 LAB
C TRACH DNA SPEC QL PROBE+SIG AMP: NEGATIVE
N GONORRHOEA DNA SPEC QL NAA+PROBE: NEGATIVE

## 2023-10-01 ENCOUNTER — OFFICE VISIT (OUTPATIENT)
Dept: URGENT CARE | Facility: URGENT CARE | Age: 43
End: 2023-10-01
Payer: COMMERCIAL

## 2023-10-01 VITALS
DIASTOLIC BLOOD PRESSURE: 76 MMHG | WEIGHT: 172 LBS | TEMPERATURE: 98.1 F | HEIGHT: 72 IN | OXYGEN SATURATION: 99 % | HEART RATE: 78 BPM | RESPIRATION RATE: 16 BRPM | SYSTOLIC BLOOD PRESSURE: 118 MMHG | BODY MASS INDEX: 23.3 KG/M2

## 2023-10-01 DIAGNOSIS — R07.0 THROAT PAIN: Primary | ICD-10-CM

## 2023-10-01 LAB
DEPRECATED S PYO AG THROAT QL EIA: NEGATIVE
GROUP A STREP BY PCR: NOT DETECTED

## 2023-10-01 PROCEDURE — 99213 OFFICE O/P EST LOW 20 MIN: CPT | Performed by: FAMILY MEDICINE

## 2023-10-01 PROCEDURE — 87651 STREP A DNA AMP PROBE: CPT | Performed by: FAMILY MEDICINE

## 2023-10-01 NOTE — PROGRESS NOTES
SUBJECTIVE:   Eyad Venegas is a 43 year old male presenting with   Chief Complaint   Patient presents with    Urgent Care    Pharyngitis     Sore throat since Friday, swollen gland, not feeling.      Symptoms started 2 days ago.   Sore throat, swollen neck glands, feeling poorly.   No cough or nasal symptoms, no fevers, no N/V.   Travels/flys a lot for work.    Home covid test done 2 days ago was negative.    Had mono in the past.       OBJECTIVE  /76   Pulse 78   Temp 98.1  F (36.7  C) (Temporal)   Resp 16   Ht 1.829 m (6')   Wt 78 kg (172 lb)   SpO2 99%   BMI 23.33 kg/m    GENERAL:  Awake, alert and interactive. No acute distress.  HEENT:   NC/AT, EOMI, clear conjunctiva.  Nose clear.  Oropharynx erythematous without edema or exudate noted, moist and clear.  TM right partially visible and benign, TM left not visualized due to excess wax and EAC's with excess dry wax  NECK: supple, positive anterior chain adenopathy BL  CHEST:  Lungs are clear, no rhonchi, wheezing or rales. Normal symmetric air entry throughout both lung fields.   HEART:  S1 and S2 normal, no murmurs. Regular rate and rhythm.    Labs:  Results for orders placed or performed in visit on 10/01/23   Streptococcus A Rapid Screen w/Reflex to PCR - Clinic Collect     Status: Normal    Specimen: Throat; Swab   Result Value Ref Range    Group A Strep antigen Negative Negative           ASSESSMENT/PLAN    ICD-10-CM    1. Throat pain  R07.0 Streptococcus A Rapid Screen w/Reflex to PCR - Clinic Collect     Group A Streptococcus PCR Throat Swab          Back up strep pending.   Viral pharyngitis vs early URI suspected.   We discussed the expected course of the illness and symptomatic cares in detail.   Advised to return to care if symptoms not improving as expected, do not resolve completely, or if any new or worsening symptoms develop.

## 2023-10-27 ENCOUNTER — TRANSFERRED RECORDS (OUTPATIENT)
Dept: HEALTH INFORMATION MANAGEMENT | Facility: CLINIC | Age: 43
End: 2023-10-27
Payer: COMMERCIAL

## 2023-11-26 ENCOUNTER — HEALTH MAINTENANCE LETTER (OUTPATIENT)
Age: 43
End: 2023-11-26

## 2024-02-04 ENCOUNTER — APPOINTMENT (OUTPATIENT)
Dept: GENERAL RADIOLOGY | Facility: CLINIC | Age: 44
End: 2024-02-04
Attending: EMERGENCY MEDICINE
Payer: COMMERCIAL

## 2024-02-04 ENCOUNTER — HOSPITAL ENCOUNTER (EMERGENCY)
Facility: CLINIC | Age: 44
Discharge: HOME | End: 2024-02-04
Attending: EMERGENCY MEDICINE | Admitting: EMERGENCY MEDICINE
Payer: COMMERCIAL

## 2024-02-04 VITALS
HEIGHT: 72 IN | TEMPERATURE: 97.5 F | WEIGHT: 175 LBS | RESPIRATION RATE: 18 BRPM | HEART RATE: 56 BPM | BODY MASS INDEX: 23.7 KG/M2 | SYSTOLIC BLOOD PRESSURE: 120 MMHG | OXYGEN SATURATION: 98 % | DIASTOLIC BLOOD PRESSURE: 66 MMHG

## 2024-02-04 DIAGNOSIS — J10.1 INFLUENZA A: ICD-10-CM

## 2024-02-04 LAB
FLUAV RNA SPEC QL NAA+PROBE: POSITIVE
FLUBV RNA RESP QL NAA+PROBE: NEGATIVE
RSV RNA SPEC NAA+PROBE: NEGATIVE
SARS-COV-2 RNA RESP QL NAA+PROBE: NEGATIVE

## 2024-02-04 PROCEDURE — 99284 EMERGENCY DEPT VISIT MOD MDM: CPT | Mod: 25

## 2024-02-04 PROCEDURE — 87637 SARSCOV2&INF A&B&RSV AMP PRB: CPT | Performed by: EMERGENCY MEDICINE

## 2024-02-04 PROCEDURE — 71046 X-RAY EXAM CHEST 2 VIEWS: CPT

## 2024-02-04 PROCEDURE — 250N000013 HC RX MED GY IP 250 OP 250 PS 637: Performed by: EMERGENCY MEDICINE

## 2024-02-04 RX ORDER — IBUPROFEN 600 MG/1
600 TABLET, FILM COATED ORAL ONCE
Status: COMPLETED | OUTPATIENT
Start: 2024-02-04 | End: 2024-02-04

## 2024-02-04 RX ORDER — BENZONATATE 200 MG/1
200 CAPSULE ORAL 3 TIMES DAILY PRN
Qty: 15 CAPSULE | Refills: 0 | Status: SHIPPED | OUTPATIENT
Start: 2024-02-04 | End: 2024-07-15

## 2024-02-04 RX ORDER — BENZONATATE 100 MG/1
200 CAPSULE ORAL ONCE
Status: COMPLETED | OUTPATIENT
Start: 2024-02-04 | End: 2024-02-04

## 2024-02-04 RX ORDER — ACETAMINOPHEN 500 MG
1000 TABLET ORAL ONCE
Status: COMPLETED | OUTPATIENT
Start: 2024-02-04 | End: 2024-02-04

## 2024-02-04 RX ADMIN — ACETAMINOPHEN 1000 MG: 500 TABLET ORAL at 08:06

## 2024-02-04 RX ADMIN — BENZONATATE 200 MG: 100 CAPSULE ORAL at 08:07

## 2024-02-04 RX ADMIN — IBUPROFEN 600 MG: 600 TABLET ORAL at 08:07

## 2024-02-04 ASSESSMENT — ACTIVITIES OF DAILY LIVING (ADL): ADLS_ACUITY_SCORE: 35

## 2024-02-04 NOTE — DISCHARGE INSTRUCTIONS
You are positive for influenza A.  Your chest x-ray does not show any evidence of pneumonia.  Get plenty of rest and drink plenty of fluids.  Tylenol 1000 mg and/or ibuprofen 600 mg every 6-8 hours to help with the aches and the pain.

## 2024-02-04 NOTE — ED TRIAGE NOTES
Cough, fever, chills started on Wednesday, travels every week for work.     Triage Assessment (Adult)       Row Name 02/04/24 0743          Triage Assessment    Airway WDL WDL        Respiratory WDL    Respiratory WDL X;cough     Cough Frequency frequent        Skin Circulation/Temperature WDL    Skin Circulation/Temperature WDL WDL        Cardiac WDL    Cardiac WDL WDL        Peripheral/Neurovascular WDL    Peripheral Neurovascular WDL WDL        Cognitive/Neuro/Behavioral WDL    Cognitive/Neuro/Behavioral WDL WDL

## 2024-02-04 NOTE — ED PROVIDER NOTES
History     Chief Complaint:  Fever, Cough, and Chills       HPI   Eyad Venegas is a 43 year old male, otherwise healthy, who presents to the emergency room with influenza-like symptoms.  His symptoms started Wednesday.  He describes myalgias, fever, cough that is so bad it is causing a sore throat.  It has been a somewhat productive cough.  At times he does vomit.  No diarrhea.  He is otherwise healthy.  No known sick contacts.  He did travel to [place] last week.      Independent Historian:    patient    Review of External Notes:        Medications:    benzonatate (TESSALON) 200 MG capsule        Past Medical History:    No past medical history on file.    Past Surgical History:    Past Surgical History:   Procedure Laterality Date    HC EXPLORE UNDESC TESTIS,INGUIN/SCROTAL      Description: Surgery Testis Exploration Of Undescended Testis;  Recorded: 07/27/2010;  Comments: thinks R-when very young    MOLE REMOVAL            Physical Exam   Patient Vitals for the past 24 hrs:   BP Temp Temp src Pulse Resp SpO2 Height Weight   02/04/24 0753 -- -- -- -- 18 -- -- --   02/04/24 0749 120/66 97.5  F (36.4  C) Oral 56 -- 98 % 1.829 m (6') 79.4 kg (175 lb)        Physical Exam    Physical Exam   Constitutional:  Patient is oriented to person, place, and time. They appear well-developed and well-nourished. Mild distress secondary to myalgias  HENT:   Mouth/Throat:   Oropharynx is clear and moist.   Eyes:    Conjunctivae normal and EOM are normal. Pupils are equal, round, and reactive to light.   Neck:    Normal range of motion.   Cardiovascular: Normal rate, regular rhythm and normal heart sounds.  Exam reveals no gallop and no friction rub.  No murmur heard.  Pulmonary/Chest:  Effort normal and breath sounds normal. Patient has no wheezes. Patient has no rales.   Abdominal:   Soft. Bowel sounds are normal. Patient exhibits no mass. There is no tenderness. There is no rebound and no guarding.   Musculoskeletal:   Normal range of motion. Patient exhibits no edema.   Neurological:   Patient is alert and oriented to person, place, and time. Patient has normal strength. No cranial nerve deficit or sensory deficit. GCS 15  Skin:   Skin is warm and dry. No rash noted. No erythema.   Psychiatric:   Patient has a normal mood    Emergency Department Course     Imaging:  XR Chest 2 Views   Final Result   IMPRESSION: Negative chest.        Report per radiology    Laboratory:  Labs Ordered and Resulted from Time of ED Arrival to Time of ED Departure   INFLUENZA A/B, RSV, & SARS-COV2 PCR - Abnormal       Result Value    Influenza A PCR Positive (*)     Influenza B PCR Negative      RSV PCR Negative      SARS CoV2 PCR Negative          Procedures   None    Emergency Department Course & Assessments:             Interventions:  Medications   acetaminophen (TYLENOL) tablet 1,000 mg (1,000 mg Oral $Given 2/4/24 0806)   ibuprofen (ADVIL/MOTRIN) tablet 600 mg (600 mg Oral $Given 2/4/24 0807)   benzonatate (TESSALON) capsule 200 mg (200 mg Oral $Given 2/4/24 0807)          Independent Interpretation (X-rays, CTs, rhythm strip):  I reviewed the images agree that there is no acute infiltrate or pneumothorax    Consultations/Discussion of Management or Tests:  None       Social Determinants of Health affecting care:  None     Disposition:  The patient was discharged to home.     Impression & Plan    CMS Diagnoses: None    Medical Decision Making:  Eyad Venegas is a 43-year-old gentleman presenting with cough myalgias fever vomiting.  His vital signs were all very reassuring.  He did not have any respiratory distress or abnormal lung sounds.  A nasal swab was done which was positive for influenza A which fits exactly with his symptoms.  I did do a chest x-ray as his cough and myalgias were probably the worst symptoms for him.  Chest x-ray fortunately shows no evidence of pneumonia pneumothorax effusions, mass or abnormal mediastinum.  He  was provided high-dose Tylenol ibuprofen as well as Tessalon here.  I will prescribe him Tessalon for cough.  I also advised rest fluids and symptomatic cares for influenza.  He is safe to be discharged.          Diagnosis:    ICD-10-CM    1. Influenza A  J10.1            Discharge Medications:  New Prescriptions    BENZONATATE (TESSALON) 200 MG CAPSULE    Take 1 capsule (200 mg) by mouth 3 times daily as needed for cough          Silvina Madison MD  2/4/2024   Silvina Madison MD Bochert, Michelle Ann, MD  02/04/24 0919

## 2024-03-05 ENCOUNTER — TRANSFERRED RECORDS (OUTPATIENT)
Dept: HEALTH INFORMATION MANAGEMENT | Facility: CLINIC | Age: 44
End: 2024-03-05
Payer: COMMERCIAL

## 2024-07-12 ENCOUNTER — ANCILLARY PROCEDURE (OUTPATIENT)
Dept: GENERAL RADIOLOGY | Facility: CLINIC | Age: 44
End: 2024-07-12
Attending: PHYSICIAN ASSISTANT
Payer: COMMERCIAL

## 2024-07-12 ENCOUNTER — OFFICE VISIT (OUTPATIENT)
Dept: URGENT CARE | Facility: URGENT CARE | Age: 44
End: 2024-07-12
Payer: COMMERCIAL

## 2024-07-12 VITALS
HEART RATE: 60 BPM | SYSTOLIC BLOOD PRESSURE: 120 MMHG | WEIGHT: 172 LBS | BODY MASS INDEX: 23.33 KG/M2 | DIASTOLIC BLOOD PRESSURE: 77 MMHG | TEMPERATURE: 97.9 F | RESPIRATION RATE: 16 BRPM | OXYGEN SATURATION: 98 %

## 2024-07-12 DIAGNOSIS — R20.0 NUMBNESS AND TINGLING OF LEFT LOWER EXTREMITY: Primary | ICD-10-CM

## 2024-07-12 DIAGNOSIS — Z13.1 SCREENING FOR DIABETES MELLITUS: ICD-10-CM

## 2024-07-12 DIAGNOSIS — R20.2 NUMBNESS AND TINGLING OF LEFT LOWER EXTREMITY: Primary | ICD-10-CM

## 2024-07-12 LAB
ALBUMIN SERPL BCG-MCNC: 4.8 G/DL (ref 3.5–5.2)
ALP SERPL-CCNC: 99 U/L (ref 40–150)
ALT SERPL W P-5'-P-CCNC: 21 U/L (ref 0–70)
ANION GAP SERPL CALCULATED.3IONS-SCNC: 9 MMOL/L (ref 7–15)
AST SERPL W P-5'-P-CCNC: 21 U/L (ref 0–45)
BASOPHILS # BLD AUTO: 0.1 10E3/UL (ref 0–0.2)
BASOPHILS NFR BLD AUTO: 1 %
BILIRUB SERPL-MCNC: 0.9 MG/DL
BUN SERPL-MCNC: 14.8 MG/DL (ref 6–20)
CALCIUM SERPL-MCNC: 9.9 MG/DL (ref 8.6–10)
CHLORIDE SERPL-SCNC: 101 MMOL/L (ref 98–107)
CHOLEST SERPL-MCNC: 292 MG/DL
CREAT SERPL-MCNC: 0.98 MG/DL (ref 0.67–1.17)
DEPRECATED HCO3 PLAS-SCNC: 29 MMOL/L (ref 22–29)
EGFRCR SERPLBLD CKD-EPI 2021: >90 ML/MIN/1.73M2
EOSINOPHIL # BLD AUTO: 0.1 10E3/UL (ref 0–0.7)
EOSINOPHIL NFR BLD AUTO: 1 %
ERYTHROCYTE [DISTWIDTH] IN BLOOD BY AUTOMATED COUNT: 12.4 % (ref 10–15)
FASTING STATUS PATIENT QL REPORTED: NO
FASTING STATUS PATIENT QL REPORTED: NO
GLUCOSE SERPL-MCNC: 97 MG/DL (ref 70–99)
HBA1C MFR BLD: 5.4 % (ref 0–5.6)
HCT VFR BLD AUTO: 46.3 % (ref 40–53)
HDLC SERPL-MCNC: 59 MG/DL
HGB BLD-MCNC: 15.4 G/DL (ref 13.3–17.7)
IMM GRANULOCYTES # BLD: 0 10E3/UL
IMM GRANULOCYTES NFR BLD: 0 %
LDLC SERPL CALC-MCNC: 219 MG/DL
LYMPHOCYTES # BLD AUTO: 2.1 10E3/UL (ref 0.8–5.3)
LYMPHOCYTES NFR BLD AUTO: 31 %
MCH RBC QN AUTO: 30.1 PG (ref 26.5–33)
MCHC RBC AUTO-ENTMCNC: 33.3 G/DL (ref 31.5–36.5)
MCV RBC AUTO: 91 FL (ref 78–100)
MONOCYTES # BLD AUTO: 0.5 10E3/UL (ref 0–1.3)
MONOCYTES NFR BLD AUTO: 8 %
NEUTROPHILS # BLD AUTO: 4 10E3/UL (ref 1.6–8.3)
NEUTROPHILS NFR BLD AUTO: 60 %
NONHDLC SERPL-MCNC: 233 MG/DL
PLATELET # BLD AUTO: 302 10E3/UL (ref 150–450)
POTASSIUM SERPL-SCNC: 4.6 MMOL/L (ref 3.4–5.3)
PROT SERPL-MCNC: 8 G/DL (ref 6.4–8.3)
RBC # BLD AUTO: 5.11 10E6/UL (ref 4.4–5.9)
SODIUM SERPL-SCNC: 139 MMOL/L (ref 135–145)
TRIGL SERPL-MCNC: 72 MG/DL
TSH SERPL DL<=0.005 MIU/L-ACNC: 0.8 UIU/ML (ref 0.3–4.2)
WBC # BLD AUTO: 6.8 10E3/UL (ref 4–11)

## 2024-07-12 PROCEDURE — 85025 COMPLETE CBC W/AUTO DIFF WBC: CPT | Performed by: PHYSICIAN ASSISTANT

## 2024-07-12 PROCEDURE — 73630 X-RAY EXAM OF FOOT: CPT | Mod: TC | Performed by: RADIOLOGY

## 2024-07-12 PROCEDURE — 80053 COMPREHEN METABOLIC PANEL: CPT | Performed by: PHYSICIAN ASSISTANT

## 2024-07-12 PROCEDURE — 83036 HEMOGLOBIN GLYCOSYLATED A1C: CPT | Performed by: PHYSICIAN ASSISTANT

## 2024-07-12 PROCEDURE — 80061 LIPID PANEL: CPT | Performed by: PHYSICIAN ASSISTANT

## 2024-07-12 PROCEDURE — 84443 ASSAY THYROID STIM HORMONE: CPT | Performed by: PHYSICIAN ASSISTANT

## 2024-07-12 PROCEDURE — 99214 OFFICE O/P EST MOD 30 MIN: CPT | Performed by: PHYSICIAN ASSISTANT

## 2024-07-12 PROCEDURE — 36415 COLL VENOUS BLD VENIPUNCTURE: CPT | Performed by: PHYSICIAN ASSISTANT

## 2024-07-12 ASSESSMENT — ENCOUNTER SYMPTOMS
PARESTHESIAS: 1
NUMBNESS: 1

## 2024-07-12 NOTE — PROGRESS NOTES
Assessment & Plan     Numbness and tingling of left lower extremity    -Patient with numbness and tingling of the left foot and leg.  Lipid panel shows hyperlipidemia.  Patient to repeat labs with primary care provider in 3 months.  CBC is reassuring, CMP shows normal creatinine and normal liver function.  TSH shows normal thyroid.  X-ray of the foot negative for fracture per radiology report.  Hemoglobin A1c is normal.  I completed primary care scheduling referral for patient.  Patient will follow-up with primary care provider for further evaluation of his symptoms.  Patient may need better imaging.  - Lipid panel reflex to direct LDL Non-fasting  - CBC with platelets and differential  - Comprehensive metabolic panel (BMP + Alb, Alk Phos, ALT, AST, Total. Bili, TP)  - TSH with free T4 reflex  - XR Foot Left G/E 3 Views  - Hemoglobin A1c    Screening for diabetes mellitus    -A1c within normal limit.  No concerns for prediabetes or diabetes  - Hemoglobin A1c     Results for orders placed or performed in visit on 07/12/24   XR Foot Left G/E 3 Views     Status: None    Narrative    FOOT THREE VIEWS LEFT  7/12/2024 2:47 PM     HISTORY: foot numbness, no trauma; Numbness and tingling of left lower  extremity; Numbness and tingling of left lower extremity  COMPARISON: None.      Impression    IMPRESSION: No acute fracture or malalignment. There is normal joint  spacing.    ROMAIN WATKINS MD         SYSTEM ID:  [id number]   Lipid panel reflex to direct LDL Non-fasting     Status: Abnormal   Result Value Ref Range    Cholesterol 292 (H) <200 mg/dL    Triglycerides 72 <150 mg/dL    Direct Measure HDL 59 >=40 mg/dL    LDL Cholesterol Calculated 219 (H) <=100 mg/dL    Non HDL Cholesterol 233 (H) <130 mg/dL    Patient Fasting > 8hrs? No     Narrative    Cholesterol  Desirable:  <200 mg/dL    Triglycerides  Normal:  Less than 150 mg/dL  Borderline High:  150-199 mg/dL  High:  200-499 mg/dL  Very High:  Greater than or equal  to 500 mg/dL    Direct Measure HDL  Female:  Greater than or equal to 50 mg/dL   Male:  Greater than or equal to 40 mg/dL    LDL Cholesterol  Desirable:  <100mg/dL  Above Desirable:  100-129 mg/dL   Borderline High:  130-159 mg/dL   High:  160-189 mg/dL   Very High:  >= 190 mg/dL    Non HDL Cholesterol  Desirable:  130 mg/dL  Above Desirable:  130-159 mg/dL  Borderline High:  160-189 mg/dL  High:  190-219 mg/dL  Very High:  Greater than or equal to 220 mg/dL   Comprehensive metabolic panel (BMP + Alb, Alk Phos, ALT, AST, Total. Bili, TP)     Status: None   Result Value Ref Range    Sodium 139 135 - 145 mmol/L    Potassium 4.6 3.4 - 5.3 mmol/L    Carbon Dioxide (CO2) 29 22 - 29 mmol/L    Anion Gap 9 7 - 15 mmol/L    Urea Nitrogen 14.8 6.0 - 20.0 mg/dL    Creatinine 0.98 0.67 - 1.17 mg/dL    GFR Estimate >90 >60 mL/min/1.73m2    Calcium 9.9 8.6 - 10.0 mg/dL    Chloride 101 98 - 107 mmol/L    Glucose 97 70 - 99 mg/dL    Alkaline Phosphatase 99 40 - 150 U/L    AST 21 0 - 45 U/L    ALT 21 0 - 70 U/L    Protein Total 8.0 6.4 - 8.3 g/dL    Albumin 4.8 3.5 - 5.2 g/dL    Bilirubin Total 0.9 <=1.2 mg/dL    Patient Fasting > 8hrs? No    TSH with free T4 reflex     Status: Normal   Result Value Ref Range    TSH 0.80 0.30 - 4.20 uIU/mL   CBC with platelets and differential     Status: None   Result Value Ref Range    WBC Count 6.8 4.0 - 11.0 10e3/uL    RBC Count 5.11 4.40 - 5.90 10e6/uL    Hemoglobin 15.4 13.3 - 17.7 g/dL    Hematocrit 46.3 40.0 - 53.0 %    MCV 91 78 - 100 fL    MCH 30.1 26.5 - 33.0 pg    MCHC 33.3 31.5 - 36.5 g/dL    RDW 12.4 10.0 - 15.0 %    Platelet Count 302 150 - 450 10e3/uL    % Neutrophils 60 %    % Lymphocytes 31 %    % Monocytes 8 %    % Eosinophils 1 %    % Basophils 1 %    % Immature Granulocytes 0 %    Absolute Neutrophils 4.0 1.6 - 8.3 10e3/uL    Absolute Lymphocytes 2.1 0.8 - 5.3 10e3/uL    Absolute Monocytes 0.5 0.0 - 1.3 10e3/uL    Absolute Eosinophils 0.1 0.0 - 0.7 10e3/uL    Absolute Basophils  0.1 0.0 - 0.2 10e3/uL    Absolute Immature Granulocytes 0.0 <=0.4 10e3/uL   Hemoglobin A1c     Status: Normal   Result Value Ref Range    Hemoglobin A1C 5.4 0.0 - 5.6 %   CBC with platelets and differential     Status: None    Narrative    The following orders were created for panel order CBC with platelets and differential.  Procedure                               Abnormality         Status                     ---------                               -----------         ------                     CBC with platelets and d...[[id number]]                      Final result                 Please view results for these tests on the individual orders.       There are no Patient Instructions on file for this visit.    No follow-ups on file.    At the end of the encounter, I discussed results, diagnosis, medications. Discussed red flags for immediate return to clinic/ER, as well as indications for follow up if no improvement. Patient understood and agreed to plan. Patient was stable for discharge.    Subjective     TK is a 44 year old male who presents to clinic today for the following health issues:  Chief Complaint   Patient presents with    Urgent Care     Left foot numb and tingling on and off since Sunday. Mostly happens when moving.      HPI    Patient reports left foot numbness and tingling which started 5 days ago.  Symptoms are intermittent.  He notes the symptoms are worse when he is walking or doing activities.  He denies history of trauma, injury.  He denies weakness in the left leg.  He denies any dizziness or lightheadedness.    Review of Systems   Neurological:  Positive for numbness and paresthesias.   All other systems reviewed and are negative.      Problem List:  2014-12: Pain in joint involving ankle and foot  2014-12: Ankle sprain  2014-12: Edema      No past medical history on file.    Social History     Tobacco Use    Smoking status: Never    Smokeless tobacco: Never   Substance Use Topics    Alcohol  use: Yes     Alcohol/week: 5.0 standard drinks of alcohol           Objective    /77   Pulse 60   Temp 97.9  F (36.6  C) (Temporal)   Resp 16   Wt 78 kg (172 lb)   SpO2 98%   BMI 23.33 kg/m    Physical Exam  Vitals and nursing note reviewed.   Constitutional:       Appearance: Normal appearance.   Cardiovascular:      Rate and Rhythm: Normal rate and regular rhythm.   Pulmonary:      Effort: Pulmonary effort is normal.      Breath sounds: Normal breath sounds.   Musculoskeletal:         General: Normal range of motion.      Cervical back: Normal range of motion and neck supple.   Skin:     General: Skin is warm and dry.      Findings: No rash.   Neurological:      General: No focal deficit present.      Mental Status: He is alert and oriented to person, place, and time.      Cranial Nerves: Cranial nerves 2-12 are intact.      Sensory: Sensation is intact.      Motor: Motor function is intact.      Gait: Gait is intact.      Deep Tendon Reflexes:      Reflex Scores:       Patellar reflexes are 2+ on the right side and 2+ on the left side.       Achilles reflexes are 2+ on the right side and 2+ on the left side.  Psychiatric:         Mood and Affect: Mood normal.         Behavior: Behavior normal.              Antonia Teixeira PA-C

## 2024-07-15 ENCOUNTER — OFFICE VISIT (OUTPATIENT)
Dept: FAMILY MEDICINE | Facility: CLINIC | Age: 44
End: 2024-07-15
Payer: COMMERCIAL

## 2024-07-15 VITALS
HEIGHT: 72 IN | HEART RATE: 71 BPM | RESPIRATION RATE: 14 BRPM | SYSTOLIC BLOOD PRESSURE: 126 MMHG | TEMPERATURE: 98.1 F | WEIGHT: 173.7 LBS | DIASTOLIC BLOOD PRESSURE: 70 MMHG | BODY MASS INDEX: 23.53 KG/M2 | OXYGEN SATURATION: 97 %

## 2024-07-15 DIAGNOSIS — E78.00 HYPERCHOLESTEREMIA: Primary | ICD-10-CM

## 2024-07-15 DIAGNOSIS — R20.0 NUMBNESS OF LEFT FOOT: ICD-10-CM

## 2024-07-15 PROCEDURE — 99214 OFFICE O/P EST MOD 30 MIN: CPT | Performed by: FAMILY MEDICINE

## 2024-07-15 PROCEDURE — G2211 COMPLEX E/M VISIT ADD ON: HCPCS | Performed by: FAMILY MEDICINE

## 2024-07-15 RX ORDER — FEXOFENADINE HCL 180 MG/1
180 TABLET ORAL DAILY
COMMUNITY

## 2024-07-15 RX ORDER — ATORVASTATIN CALCIUM 20 MG/1
20 TABLET, FILM COATED ORAL AT BEDTIME
Qty: 90 TABLET | Refills: 1 | Status: SHIPPED | OUTPATIENT
Start: 2024-07-15

## 2024-07-15 ASSESSMENT — PAIN SCALES - GENERAL: PAINLEVEL: NO PAIN (0)

## 2024-07-15 NOTE — PROGRESS NOTES
Assessment & Plan     (E78.00) Hypercholesteremia  (primary encounter diagnosis)  Comment: we reviewed the chart pt has high ldl for years. Recheck ldl 219 at 7/12/2024. Strongly family history of hypercholesteremia: parents, his aunts etc. No treatment in the past.   Plan: atorvastatin (LIPITOR) 20 MG tablet      The nature of cardiac risk has been fully discussed with this patient.  The benefits of lowering the cholesterol and improving the balance of lipids in view of the patient's age and risk status have been discussed, as well as verbal review of appropriate diet.  The need for lifelong general compliance in order to reduce risk is stressed.  A gradual exercise program is recommended to help maintain normal body weight and improve lipid balance.     Will start lipitor 20 mg at bed time. Side effect addressed. Follow-up in 3 month.      (R20.0) Numbness of left foot  Comment: pt felt leg numbness and tingling at 7/7 and 7/6 after he woke up. Then the numbness improved to only left foot. He felt flash, burning sensation. No injury, no pain, swelling and erythema. . No change of shoes. Non smoker. Exam: not remarkable. both foot normal sensation. Likely nerve compression.   Plan: since the symptom is improving, hopefully it will continue improving. Advise continue observe it. If symptom gets worse or persists will do lab/vit b 12, EMG test and neurology consult.     The longitudinal plan of care for the diagnosis(es)/condition(s) as documented were addressed during this visit. Due to the added complexity in care, I will continue to support TK in the subsequent management and with ongoing continuity of care.      See Patient Instructions    Subjective   TK is a 44 year old, presenting for the following health issues:  Follow Up (Follow up from Urgent Care visit. Numbness and tingling of left lower extremity left leg.)      7/15/2024     2:46 PM   Additional Questions   Roomed by Aster MACKEY     History of  "Present Illness       Reason for visit:  General numbness in left leg. Symptoms began July 7 2024    He eats 2-3 servings of fruits and vegetables daily.He consumes 0 sweetened beverage(s) daily.He exercises with enough effort to increase his heart rate 30 to 60 minutes per day.  He exercises with enough effort to increase his heart rate 5 days per week.   He is taking medications regularly.         Hyperlipidemia Follow-Up    Are you regularly taking any medication or supplement to lower your cholesterol?   No  Are you having muscle aches or other side effects that you think could be caused by your cholesterol lowering medication?  No       Review of Systems  Constitutional, HEENT, cardiovascular, pulmonary, gi and gu systems are negative, except as otherwise noted.      Objective    /70 (BP Location: Right arm, Patient Position: Sitting, Cuff Size: Adult Regular)   Pulse 71   Temp 98.1  F (36.7  C) (Oral)   Resp 14   Ht 1.825 m (5' 11.85\")   Wt 78.8 kg (173 lb 11.2 oz)   SpO2 97%   BMI 23.66 kg/m    Body mass index is 23.66 kg/m .  Physical Exam   GENERAL: alert and no distress  NECK: no adenopathy, no asymmetry, masses, or scars  RESP: lungs clear to auscultation - no rales, rhonchi or wheezes  CV: regular rate and rhythm, normal S1 S2, no S3 or S4, no murmur, click or rub, no peripheral edema  ABDOMEN: soft, nontender, no hepatosplenomegaly, no masses and bowel sounds normal  MS: no gross musculoskeletal defects noted, no edema   Foot exam - both sides normal; no swelling, tenderness or skin or vascular lesions. Color and temperature is normal. Sensation is intact. Peripheral pulses are palpable. Toenails are normal.  Cervical, thoracic and lumbar spine exam is normal without tenderness, masses or kyphoscoliosis. Full range of motion without pain is noted.          Signed Electronically by: Nighat Ruby MD    "

## 2024-08-05 ENCOUNTER — NURSE TRIAGE (OUTPATIENT)
Dept: FAMILY MEDICINE | Facility: CLINIC | Age: 44
End: 2024-08-05

## 2024-08-05 ENCOUNTER — OFFICE VISIT (OUTPATIENT)
Dept: FAMILY MEDICINE | Facility: CLINIC | Age: 44
End: 2024-08-05
Payer: COMMERCIAL

## 2024-08-05 VITALS
TEMPERATURE: 98.9 F | OXYGEN SATURATION: 98 % | RESPIRATION RATE: 16 BRPM | BODY MASS INDEX: 24.24 KG/M2 | HEART RATE: 59 BPM | WEIGHT: 179 LBS | HEIGHT: 72 IN | SYSTOLIC BLOOD PRESSURE: 127 MMHG | DIASTOLIC BLOOD PRESSURE: 81 MMHG

## 2024-08-05 DIAGNOSIS — R20.0 LEFT LEG NUMBNESS: Primary | ICD-10-CM

## 2024-08-05 DIAGNOSIS — M79.662 PAIN OF LEFT LOWER LEG: ICD-10-CM

## 2024-08-05 PROCEDURE — 99214 OFFICE O/P EST MOD 30 MIN: CPT | Performed by: PHYSICIAN ASSISTANT

## 2024-08-05 RX ORDER — GABAPENTIN 300 MG/1
300 CAPSULE ORAL 3 TIMES DAILY PRN
Qty: 30 CAPSULE | Refills: 0 | Status: SHIPPED | OUTPATIENT
Start: 2024-08-05

## 2024-08-05 ASSESSMENT — ENCOUNTER SYMPTOMS: NUMBNESS: 1

## 2024-08-05 ASSESSMENT — PAIN SCALES - GENERAL: PAINLEVEL: MODERATE PAIN (4)

## 2024-08-05 NOTE — PATIENT INSTRUCTIONS
At [facility], we strive to deliver an exceptional experience to you, every time we see you. If you receive a survey, please let us know what we are doing well and/or what we could improve upon, as we do value your feedback.  If you have MyChart, you can expect to receive results automatically within 24 hours of their completion.  Your provider will send a note interpreting your results as well.   If you do not have MyChart, you should receive your results in about a week by mail.    Your care team:                            Family Medicine Internal Medicine   MD Jabari Rose, MD Andra José, MD George Cruz, MD Mickie Navarrete, PASeverinoC    Ector Bullard, MD Pediatrics   Mora Dale, MD Mandi Steven, MD Ella Gannon, APRN CNP Daja Mccann APRN CNP   MD Anne Phillips, MD Tamika Baer, CNP     Alfredito Bowens, CNP Same-Day Provider (No follow-up visits)   AZAEL Orta, DNP Martha Carreon, AZAEL Kramer, FNP, BC JC DongC     Clinic hours: Monday - Thursday 7 am-6 pm; Fridays 7 am-5 pm.   Urgent care: Monday - Friday 10 am- 8 pm; Saturday and Sunday 9 am-5 pm.    Clinic: [phone]       [facility]: Monday - Thursday 8 am - 7 pm; Friday 8 am - 6 pm  [facility]: [phone]

## 2024-08-05 NOTE — PROGRESS NOTES
Assessment & Plan     Left leg numbness  Discussed with patient unclear etiology.  Came on pretty suddenly and really not having clear injury or trigger.  May be compression of nerve but fairly large area affected.  Possible for underlying neurologic disease.  Lower suspicion for vitamin B12 or thiamine deficiency.  Discussed EMG as well as neurology evaluation and orders placed.  Stressed gabapentin in the meantime to help with pain, reviewed dosing and potential adverse effects.  - EMG; Future  - Adult Neurology  Referral; Future  - gabapentin (NEURONTIN) 300 MG capsule; Take 1 capsule (300 mg) by mouth 3 times daily as needed for neuropathic pain    Pain of left lower leg  See above  - EMG; Future  - Adult Neurology  Referral; Future  - gabapentin (NEURONTIN) 300 MG capsule; Take 1 capsule (300 mg) by mouth 3 times daily as needed for neuropathic pain        Reviewed A1c, TSH, CMP, CBC, lipid from 7/12/2024.        Subjective   TK is a 44 year old, presenting for the following health issues:  Numbness      8/5/2024     4:04 PM   Additional Questions   Roomed by Amy   Accompanied by self     History of Present Illness       Reason for visit:  Pain, numbess,burning sensation in left leg has been getting worse since symptoms began on July 7  Symptom onset:  More than a month  Symptoms include:  Numbess,burning sentation,pain in left lower leg down to foot  Symptom intensity:  Moderate  Symptom progression:  Worsening  Had these symptoms before:  No    He eats 2-3 servings of fruits and vegetables daily.He consumes 0 sweetened beverage(s) daily.He exercises with enough effort to increase his heart rate 30 to 60 minutes per day.  He exercises with enough effort to increase his heart rate 5 days per week.   He is taking medications regularly.       Patient presents today for worsening left leg numbness and pain.    Woke up 7/7/2024 with tingling sensation to his medial left lower leg as well as  "numb sensation throughout his foot.  After it did not go away he presented to urgent care 7/12/2024.  Had negative x-ray and fairly unremarkable lab tests besides elevated cholesterol.    He followed up with primary care 7/15/2024.  He felt like things were not really changing, they thought it could be pinched nerve and opted to continue to observe.  Started on atorvastatin.  He initially took 1/2 tablet then the full tablet.  He did stop this recently as he was unsure if it could be making things worse and did not want to start something new during this.    7/30/2024 up until now it seems like symptoms have significantly worsened.  Lower leg still having numbness and tingling more intermittent, getting burning pain which often is the medial leg but moves around and affects his shin as well as other areas.  Foot still feels numb.  Unsure if it feels weak, feels different.  Unsure if it could be moving up.  No back pain.  No bowel or bladder dysfunction.  No swelling.  Has a bruise to the lateral lower leg, unsure what happened but the symptoms were present prior to this.  No known injury recently.  Has broken a toe on his left foot, no other past breaks or surgeries to the affected area.  Tried ibuprofen without much change.    Has still done normal activity.  Does do weight lifting, no specific injury with this.    Normal diet, typically high-protein.  Social alcohol use.    No known family history of neurologic issues.                Objective    /81 (BP Location: Left arm, Patient Position: Sitting, Cuff Size: Adult Large)   Pulse 59   Temp 98.9  F (37.2  C) (Temporal)   Resp 16   Ht 1.825 m (5' 11.85\")   Wt 81.2 kg (179 lb)   SpO2 98%   BMI 24.38 kg/m    Body mass index is 24.38 kg/m .  Physical Exam   GENERAL: alert and no distress  MS: Full active range of motion of lumbar spine and lower extremities.  No bony tenderness to palpation of left knee, ankle, foot.  Mild tenderness throughout palpation " of the calf.  Right leg used as comparison unremarkable.  SKIN: Warm, dry.  No rashes.  Healing ecchymosis to lateral left lower leg.  NEURO: Sensation to light touch intact.  Normal gait.  Normal heel and toe walk.  Distal strength intact.  Vascular: PT and DP pulses 2+.  Cap refill less than 2 seconds.            Signed Electronically by: Ivette Arcos PA-C

## 2024-08-05 NOTE — TELEPHONE ENCOUNTER
Provider Response to 2nd Level Triage Request    I have reviewed the RN documentation. My recommendation is:  Agree with the plan, pt will see provider today.   Advise pt to go to er if he develops sob, cp, palpitation etc.      Nighat Ruby MD on 8/5/2024 at 9:08 AM'

## 2024-08-05 NOTE — TELEPHONE ENCOUNTER
Nurse Triage SBAR    Is this a 2nd Level Triage? YES, LICENSED PRACTITIONER REVIEW IS REQUIRED    Situation: Patient is calling to report worsening symptoms of numbness and pain left leg and is looking for advice.   Scheduling did assist him with scheduling an appointment for 4:30 today.     Patient is currently in [place] and is getting a ride back - plans to be back between 2-3 PM today.     Background: Patient has been seen by  and primary care for numbness of left foot. Has been present since 7/7.     Per note from Dr. Ruby: Plan: since the symptom is improving, hopefully it will continue improving. Advise continue observe it. If symptom gets worse or persists will do lab/vit b 12, EMG test and neurology consult.     Patient reports he had an x ray of foot which was normal.     Assessment: Patient reports numbness in left foot is still present. Also reports sporadic pain and burning throughout left leg that have been worsening over the last 5 days. Pain comes and goes in different spots including shin, calf and thigh. Also experiences cramping.     Patient reports he has been doing a lot of long distance travel including a flight 2 weeks ago and driving long distance.     Patient reports a new bruise to outside of shin. Reports pea sized lump under bruise. He is not sure how this developed. He first noticed this 4 days ago.     Denies any swelling or discoloration of leg. Denies chest pain or shortness of breath.     Protocol Recommended Disposition:   Call ADS/Go to ED/UCC Now (Or To Office with PCP Approval)    Recommendation: Advised will consult with PCP if OK to wait for scheduled appointment. Writer advised patient to go to closed ED with onset of SOB, chest pain, worsening pain, numbness or discoloration.      ADS was considered, but patient is currently in [place].     Routed to provider    Does the patient meet one of the following criteria for ADS visit consideration? 16+ years old, with an [facility] PCP  "    TIP  Providers, please consider if this condition is appropriate for management at one of our Acute and Diagnostic Services sites.     If patient is a good candidate, please use dotphrase <dot>triageresponse and select Refer to ADS to document.    Reason for Disposition   Thigh or calf pain in only one leg and present > 1 hour   Long-distance travel in past month (e.g., car, bus, train, plane; with trip lasting 6 or more hours)    Additional Information   Negative: Followed a hip injury   Negative: Followed a knee injury   Negative: Followed an ankle or foot injury   Negative: Back pain radiating (shooting) into leg(s)   Negative: Foot pain is main symptom   Negative: Ankle pain is main symptom   Negative: Knee pain is main symptom   Negative: Leg swelling is main symptom   Negative: Chest pain   Negative: Difficulty breathing   Negative: Entire foot is cool or blue in comparison to other side   Negative: Unable to walk   Negative: Looks like a broken bone or dislocated joint (e.g., crooked or deformed)   Negative: Sounds like a life-threatening emergency to the triager   Negative: Fever and red area (or area very tender to touch)   Negative: Swollen joint and fever    Answer Assessment - Initial Assessment Questions  1. ONSET: \"When did the pain start?\"       A month ago  2. LOCATION: \"Where is the pain located?\"       Whole left leg   3. PAIN: \"How bad is the pain?\"    (Scale 1-10; or mild, moderate, severe)    -  MILD (1-3): doesn't interfere with normal activities     -  MODERATE (4-7): interferes with normal activities (e.g., work or school) or awakens from sleep, limping     -  SEVERE (8-10): excruciating pain, unable to do any normal activities, unable to walk      Moderate  4. WORK OR EXERCISE: \"Has there been any recent work or exercise that involved this part of the body?\"       NO  5. CAUSE: \"What do you think is causing the leg pain?\"      Possibly   6. OTHER SYMPTOMS: \"Do you have any other " "symptoms?\" (e.g., chest pain, back pain, breathing difficulty, swelling, rash, fever, numbness, weakness)      Numbness in left foot  7. PREGNANCY: \"Is there any chance you are pregnant?\" \"When was your last menstrual period?\"      NA    Protocols used: Leg Pain-A-[place]    "

## 2024-08-06 ENCOUNTER — OFFICE VISIT (OUTPATIENT)
Dept: NEUROLOGY | Facility: CLINIC | Age: 44
End: 2024-08-06
Attending: PHYSICIAN ASSISTANT
Payer: COMMERCIAL

## 2024-08-06 DIAGNOSIS — M79.662 PAIN OF LEFT LOWER LEG: ICD-10-CM

## 2024-08-06 DIAGNOSIS — R20.0 LEFT LEG NUMBNESS: ICD-10-CM

## 2024-08-06 PROCEDURE — 95886 MUSC TEST DONE W/N TEST COMP: CPT | Performed by: STUDENT IN AN ORGANIZED HEALTH CARE EDUCATION/TRAINING PROGRAM

## 2024-08-06 PROCEDURE — 95910 NRV CNDJ TEST 7-8 STUDIES: CPT | Performed by: STUDENT IN AN ORGANIZED HEALTH CARE EDUCATION/TRAINING PROGRAM

## 2024-08-06 NOTE — LETTER
"2024       RE: Eyad Venegas  [address]  [facility] [address]     Dear Colleague,    Thank you for referring your patient, Eyad Venegas, to the [facility] [place] [facility]. Please see a copy of my visit note below.                        [facility]  Electrodiagnostic Laboratory                 Department of Neurology                                                                                                         Test Date:  2024    Patient: TK Venegas : 1980 Physician: Devang Holm MD   Sex: Male AGE: 44 year Ref Phys:    ID#: [id number]   Technician: Natalie Schmid     History and Examination:  44-year-old male presented with 1-month history of a \"wet\", \"water running down the leg\" sensation in the left foot.  He also reports intermittent pain in the medial side of the left distal leg.  He denies any trauma to the back or knee prior to symptom onset.  This study was performed to assess for lumbosacral radiculopathy as well as mononeuropathy of the left lower limb.    Techniques:  Motor and sensory conduction studies were done with surface recording electrodes.  EMG was done with a concentric needle electrode.     Results:  Nerve conduction studies of the left lower limb were normal.  There were no conduction block or focal slowing noted on the left fibular or left tibial motor responses.  Bilateral medial and lateral plantar sensory nerve action potentials were present and symmetric.  Left tibial F-wave latency was within normal limits.    Needle EMG of the left lower limb and paraspinal muscles were normal.    Interpretation:  This is a normal study.  There is no electrophysiologic evidence of a left lumbosacral radiculopathy or mononeuropathy of the left lower limb in the current study.    Devang Holm MD  Department of Neurology        Nerve Conduction Studies  Motor " Sites      Latency Neg. Amp Neg. Amp Diff Segment Distance Velocity Neg. Dur Neg Area Diff Temperature Comment   Site (ms) Norm (mV) Norm (%)  cm m/s Norm (ms) (%) ( C)    Left Fibular (EDB) Motor   Ankle 4.9  < 6.0 7.2 -  Ankle-EDB 8   6.3  30.4    Bel Fibular Head 12.6 - 7.1 - -1 Bel Fibular Head-Ankle 34.8 45  > 38 6.9 3 30.4    Pop Fossa 14.8 - 7.0 - -1 Pop Fossa-Bel Fibular Head 11 50  > 38 6.9 -2 30.5    Left Tibial (AHB) Motor   Ankle 4.0  < 6.5 9.7  > 5.0  Ankle-AH 8   7.5  31.8    Knee 15.1 - 8.1 - -16 Knee-Ankle 45 41  > 38 7.7 -15 31.7      F-Wave Sites      Min F-Lat Max-Min F-Lat Mean F-Lat   Site (ms) (ms) (ms)   Left Tibial F-Wave   Ankle 56.8 5.1 -     Sensory Sites      Onset Lat Peak Lat Amp (O-P) Amp (P-P) Segment Distance Velocity Temperature Comment   Site ms (ms)  V Norm ( V)  cm m/s Norm ( C)    Left Lateral Plantar (Mixed) Sensory   Lat Sole-Med Malleolus 3.0 3.7 3 - 5 Lat Sole-Med Malleolus 15 50 - 30.8    Right Lateral Plantar (Mixed) Sensory   Lat Sole-Med Malleolus 2.8 3.5 3 - 10 Lat Sole-Med Malleolus 15 54 - 30.8    Left Medial Plantar (Mixed) Sensory   Med Sole-Med Malleolus 2.8 3.8 5 - 9 Med Sole-Med Malleolus 14 50 - 30.7    Right Medial Plantar (Mixed) Sensory   Med Sole-Med Malleolus 3.0 3.8 6 - 12 Med Sole-Med Malleolus 14 47 - 30.8    Left Superficial Fibular Sensory   Lower Leg-Ankle 2.8 3.5 14  > 3 16 Lower Leg-Ankle 12.5 45 - 31.2    Left Sural Sensory   Calf-Lat Malleolus 3.4 4.4 23  > 5 28 Calf-Lat Malleolus 14 41  > 38 30.9        Electromyography     Side Muscle Ins Act Fibs/PSW Fasc HF Amp Dur Poly Recrt Int Pat   Left Tib ant Nml None Nml 0 Nml Nml 0 Nml Nml   Left Gastroc MH Nml None Nml 0 Nml Nml 0 Nml Nml   Left Vastus med Nml None Nml 0 Nml Nml 0 Nml Nml   Left Tens fasc lat Nml None Nml 0 Nml Nml 0 Nml Nml   Left Lumbar PSP Nml None Nml 0 Nml Nml 0 Nml Nml   Left AH Nml None Nml 0 Nml Nml 0 Nml Nml   Left Fib longus Nml None Nml 0 Nml Nml 0 Nml Nml   Left FDL Nml  None Nml 0 Nml Nml 0 Nml Nml       NCS Waveforms:    Motor         Sensory                    F-Wave                 Again, thank you for allowing me to participate in the care of your patient.      Sincerely,    Devang Holm MD

## 2024-08-06 NOTE — PROGRESS NOTES
"                    [facility]  Electrodiagnostic Laboratory                 Department of Neurology                                                                                                         Test Date:  2024    Patient: TK Venegas : 1980 Physician: Devang Holm MD   Sex: Male AGE: 44 year Ref Phys:    ID#: [id number]   Technician: Natalie Schmid     History and Examination:  44-year-old male presented with 1-month history of a \"wet\", \"water running down the leg\" sensation in the left foot.  He also reports intermittent pain in the medial side of the left distal leg.  He denies any trauma to the back or knee prior to symptom onset.  This study was performed to assess for lumbosacral radiculopathy as well as mononeuropathy of the left lower limb.    Techniques:  Motor and sensory conduction studies were done with surface recording electrodes.  EMG was done with a concentric needle electrode.     Results:  Nerve conduction studies of the left lower limb were normal.  There were no conduction block or focal slowing noted on the left fibular or left tibial motor responses.  Bilateral medial and lateral plantar sensory nerve action potentials were present and symmetric.  Left tibial F-wave latency was within normal limits.    Needle EMG of the left lower limb and paraspinal muscles were normal.    Interpretation:  This is a normal study.  There is no electrophysiologic evidence of a left lumbosacral radiculopathy or mononeuropathy of the left lower limb in the current study.    Devang Holm MD  Department of Neurology        Nerve Conduction Studies  Motor Sites      Latency Neg. Amp Neg. Amp Diff Segment Distance Velocity Neg. Dur Neg Area Diff Temperature Comment   Site (ms) Norm (mV) Norm (%)  cm m/s Norm (ms) (%) ( C)    Left Fibular (EDB) Motor   Ankle 4.9  < 6.0 7.2 -  Ankle-EDB 8   6.3  30.4    Bel Fibular Head 12.6 - 7.1 - -1 Bel Fibular Head-Ankle 34.8 45  > " 38 6.9 3 30.4    Pop Fossa 14.8 - 7.0 - -1 Pop Fossa-Bel Fibular Head 11 50  > 38 6.9 -2 30.5    Left Tibial (AHB) Motor   Ankle 4.0  < 6.5 9.7  > 5.0  Ankle-AH 8   7.5  31.8    Knee 15.1 - 8.1 - -16 Knee-Ankle 45 41  > 38 7.7 -15 31.7      F-Wave Sites      Min F-Lat Max-Min F-Lat Mean F-Lat   Site (ms) (ms) (ms)   Left Tibial F-Wave   Ankle 56.8 5.1 -     Sensory Sites      Onset Lat Peak Lat Amp (O-P) Amp (P-P) Segment Distance Velocity Temperature Comment   Site ms (ms)  V Norm ( V)  cm m/s Norm ( C)    Left Lateral Plantar (Mixed) Sensory   Lat Sole-Med Malleolus 3.0 3.7 3 - 5 Lat Sole-Med Malleolus 15 50 - 30.8    Right Lateral Plantar (Mixed) Sensory   Lat Sole-Med Malleolus 2.8 3.5 3 - 10 Lat Sole-Med Malleolus 15 54 - 30.8    Left Medial Plantar (Mixed) Sensory   Med Sole-Med Malleolus 2.8 3.8 5 - 9 Med Sole-Med Malleolus 14 50 - 30.7    Right Medial Plantar (Mixed) Sensory   Med Sole-Med Malleolus 3.0 3.8 6 - 12 Med Sole-Med Malleolus 14 47 - 30.8    Left Superficial Fibular Sensory   Lower Leg-Ankle 2.8 3.5 14  > 3 16 Lower Leg-Ankle 12.5 45 - 31.2    Left Sural Sensory   Calf-Lat Malleolus 3.4 4.4 23  > 5 28 Calf-Lat Malleolus 14 41  > 38 30.9        Electromyography     Side Muscle Ins Act Fibs/PSW Fasc HF Amp Dur Poly Recrt Int Pat   Left Tib ant Nml None Nml 0 Nml Nml 0 Nml Nml   Left Gastroc MH Nml None Nml 0 Nml Nml 0 Nml Nml   Left Vastus med Nml None Nml 0 Nml Nml 0 Nml Nml   Left Tens fasc lat Nml None Nml 0 Nml Nml 0 Nml Nml   Left Lumbar PSP Nml None Nml 0 Nml Nml 0 Nml Nml   Left AH Nml None Nml 0 Nml Nml 0 Nml Nml   Left Fib longus Nml None Nml 0 Nml Nml 0 Nml Nml   Left FDL Nml None Nml 0 Nml Nml 0 Nml Nml       NCS Waveforms:    Motor         Sensory                    F-Wave

## 2024-08-08 ENCOUNTER — MYC MEDICAL ADVICE (OUTPATIENT)
Dept: FAMILY MEDICINE | Facility: CLINIC | Age: 44
End: 2024-08-08
Payer: COMMERCIAL

## 2024-08-16 ENCOUNTER — TRANSFERRED RECORDS (OUTPATIENT)
Dept: HEALTH INFORMATION MANAGEMENT | Facility: CLINIC | Age: 44
End: 2024-08-16
Payer: COMMERCIAL

## 2024-10-15 DIAGNOSIS — E78.00 HYPERCHOLESTEREMIA: ICD-10-CM

## 2024-10-16 RX ORDER — ATORVASTATIN CALCIUM 20 MG/1
20 TABLET, FILM COATED ORAL AT BEDTIME
Qty: 30 TABLET | Refills: 0 | Status: SHIPPED | OUTPATIENT
Start: 2024-10-16 | End: 2024-10-16

## 2024-10-16 RX ORDER — ATORVASTATIN CALCIUM 20 MG/1
20 TABLET, FILM COATED ORAL AT BEDTIME
Qty: 90 TABLET | Refills: 0 | Status: SHIPPED | OUTPATIENT
Start: 2024-10-16

## 2024-10-16 NOTE — TELEPHONE ENCOUNTER
Please inform pt that he Needs to see doctor for further refill. I refilled to bridge him     Nighat Ruby MD on 10/16/2024 at 9:05 AM;

## 2024-10-17 ENCOUNTER — MYC MEDICAL ADVICE (OUTPATIENT)
Dept: FAMILY MEDICINE | Facility: CLINIC | Age: 44
End: 2024-10-17
Payer: COMMERCIAL

## 2024-10-17 ENCOUNTER — MYC REFILL (OUTPATIENT)
Dept: FAMILY MEDICINE | Facility: CLINIC | Age: 44
End: 2024-10-17
Payer: COMMERCIAL

## 2024-10-17 DIAGNOSIS — E78.00 HYPERCHOLESTEREMIA: ICD-10-CM

## 2024-10-17 RX ORDER — ATORVASTATIN CALCIUM 20 MG/1
20 TABLET, FILM COATED ORAL AT BEDTIME
Qty: 90 TABLET | Refills: 0 | OUTPATIENT
Start: 2024-10-17

## 2024-10-17 NOTE — TELEPHONE ENCOUNTER
Spoke with [facility] and then for some reason did not receive e-script.    Verbal script given to pharmacist per script in our file.       Outpatient Medication Detail     Disp Refills Start End ARIANE   atorvastatin (LIPITOR) 20 MG tablet 90 tablet 0 10/16/2024 -- No   Sig - Route: Take 1 tablet (20 mg) by mouth at bedtime. Need to see doctor for further refill - Oral   Sent to pharmacy as: Atorvastatin Calcium 20 MG Oral Tablet (LIPITOR)   Class: E-Prescribe   Order: [id number]   E-Prescribing Status: Receipt confirmed by pharmacy (10/16/2024  9:05 AM CDT)

## 2024-10-17 NOTE — TELEPHONE ENCOUNTER
I ordered lipitor 90 tablets yesterday. Please call pharmacy to confirm it.     Nighat Ruby MD on 10/17/2024 at 3:04 PM'

## 2024-10-21 ENCOUNTER — TRANSFERRED RECORDS (OUTPATIENT)
Dept: HEALTH INFORMATION MANAGEMENT | Facility: CLINIC | Age: 44
End: 2024-10-21
Payer: COMMERCIAL

## 2024-10-30 ENCOUNTER — OFFICE VISIT (OUTPATIENT)
Dept: FAMILY MEDICINE | Facility: CLINIC | Age: 44
End: 2024-10-30
Payer: COMMERCIAL

## 2024-10-30 VITALS
TEMPERATURE: 97.9 F | BODY MASS INDEX: 25.19 KG/M2 | WEIGHT: 186 LBS | DIASTOLIC BLOOD PRESSURE: 84 MMHG | OXYGEN SATURATION: 95 % | SYSTOLIC BLOOD PRESSURE: 120 MMHG | HEIGHT: 72 IN | HEART RATE: 55 BPM | RESPIRATION RATE: 16 BRPM

## 2024-10-30 DIAGNOSIS — G62.9 NEUROPATHY: ICD-10-CM

## 2024-10-30 DIAGNOSIS — Z00.00 ROUTINE GENERAL MEDICAL EXAMINATION AT A HEALTH CARE FACILITY: Primary | ICD-10-CM

## 2024-10-30 DIAGNOSIS — Z11.59 NEED FOR HEPATITIS C SCREENING TEST: ICD-10-CM

## 2024-10-30 DIAGNOSIS — E78.00 HYPERCHOLESTEROLEMIA: ICD-10-CM

## 2024-10-30 DIAGNOSIS — E55.9 VITAMIN D DEFICIENCY: ICD-10-CM

## 2024-10-30 PROCEDURE — 90480 ADMN SARSCOV2 VAC 1/ONLY CMP: CPT | Performed by: FAMILY MEDICINE

## 2024-10-30 PROCEDURE — 90656 IIV3 VACC NO PRSV 0.5 ML IM: CPT | Performed by: FAMILY MEDICINE

## 2024-10-30 PROCEDURE — 86803 HEPATITIS C AB TEST: CPT | Performed by: FAMILY MEDICINE

## 2024-10-30 PROCEDURE — 99396 PREV VISIT EST AGE 40-64: CPT | Mod: 25 | Performed by: FAMILY MEDICINE

## 2024-10-30 PROCEDURE — 80061 LIPID PANEL: CPT | Performed by: FAMILY MEDICINE

## 2024-10-30 PROCEDURE — 36415 COLL VENOUS BLD VENIPUNCTURE: CPT | Performed by: FAMILY MEDICINE

## 2024-10-30 PROCEDURE — 84207 ASSAY OF VITAMIN B-6: CPT | Mod: 90 | Performed by: FAMILY MEDICINE

## 2024-10-30 PROCEDURE — 99214 OFFICE O/P EST MOD 30 MIN: CPT | Mod: 25 | Performed by: FAMILY MEDICINE

## 2024-10-30 PROCEDURE — 82306 VITAMIN D 25 HYDROXY: CPT | Performed by: FAMILY MEDICINE

## 2024-10-30 PROCEDURE — 90471 IMMUNIZATION ADMIN: CPT | Performed by: FAMILY MEDICINE

## 2024-10-30 PROCEDURE — 90472 IMMUNIZATION ADMIN EACH ADD: CPT | Performed by: FAMILY MEDICINE

## 2024-10-30 PROCEDURE — 91320 SARSCV2 VAC 30MCG TRS-SUC IM: CPT | Performed by: FAMILY MEDICINE

## 2024-10-30 PROCEDURE — 99000 SPECIMEN HANDLING OFFICE-LAB: CPT | Performed by: FAMILY MEDICINE

## 2024-10-30 PROCEDURE — 90651 9VHPV VACCINE 2/3 DOSE IM: CPT | Performed by: FAMILY MEDICINE

## 2024-10-30 RX ORDER — GABAPENTIN 400 MG/1
CAPSULE ORAL
COMMUNITY
Start: 2024-09-17

## 2024-10-30 SDOH — HEALTH STABILITY: PHYSICAL HEALTH: ON AVERAGE, HOW MANY MINUTES DO YOU ENGAGE IN EXERCISE AT THIS LEVEL?: 60 MIN

## 2024-10-30 SDOH — HEALTH STABILITY: PHYSICAL HEALTH: ON AVERAGE, HOW MANY DAYS PER WEEK DO YOU ENGAGE IN MODERATE TO STRENUOUS EXERCISE (LIKE A BRISK WALK)?: 6 DAYS

## 2024-10-30 ASSESSMENT — SOCIAL DETERMINANTS OF HEALTH (SDOH): HOW OFTEN DO YOU GET TOGETHER WITH FRIENDS OR RELATIVES?: THREE TIMES A WEEK

## 2024-10-30 NOTE — PROGRESS NOTES
"Preventive Care Visit  [facility]  Nighat Ruby MD, Family Medicine  Oct 30, 2024      Assessment & Plan     (Z00.00) Routine general medical examination at a health care facility  (primary encounter diagnosis)  Comment: encourage annual pe and vaccine up date  Plan:     (Z11.59) Need for hepatitis C screening test  Comment:   Plan: Hepatitis C Screen Reflex to HCV RNA Quant and         Genotype            (E55.9) Vitamin D deficiency  Comment: pt has history of vit d deficiency. He takes multiply vitamins and vit d. Denies joints pain. He likes to recheck.   Plan: Vitamin D Deficiency        Will follow-up lab. Advise regular exercise and over the counter multiply vitamins.     (E78.00) Hypercholesterolemia  Comment: we reviewed the chart pt had high ldl with strongly family history of high cholesterol. He started lipitor 2 month ago and no side effect. Non smoker.   Plan: Lipid panel reflex to direct LDL Fasting        Will follow-up lab. Strongly advise regular exercise and low fat diet.     (G62.9) Neuropathy  Comment: pt has neuropathy of his foot. He saw neurologist. Gabapentin helps some. He state his vit b 6 was \" borderline low\" . He likes recheck. He take multiply vitamins and vitamin b 6 supplement.   Plan: Vitamin B6        Will follow-up lab. Continue follow-up with neurologist.     Patient has been advised of split billing requirements and indicates understanding: Yes        BMI  Estimated body mass index is 25.23 kg/m  as calculated from the following:    Height as of this encounter: 1.829 m (6').    Weight as of this encounter: 84.4 kg (186 lb).   Weight management plan: Discussed healthy diet and exercise guidelines    Counseling  Appropriate preventive services were addressed with this patient via screening, questionnaire, or discussion as appropriate for fall prevention, nutrition, physical activity, Tobacco-use cessation, social engagement, weight loss and cognition.  " Checklist reviewing preventive services available has been given to the patient.  Reviewed patient's diet, addressing concerns and/or questions.   He is at risk for psychosocial distress and has been provided with information to reduce risk.       See Patient Instructions    Subjective   TK is a 44 year old, presenting for the following:  Physical        10/30/2024     9:56 AM   Additional Questions   Roomed by Gen RANDI CMA          Healthy Habits:     Taking medications regularly:  0  History of Present Illness       Hyperlipidemia:  He presents for follow up of hyperlipidemia.   He is taking medication to lower cholesterol. He is not having myalgia or other side effects to statin medications.    He eats 2-3 servings of fruits and vegetables daily.He consumes 0 sweetened beverage(s) daily.He exercises with enough effort to increase his heart rate 30 to 60 minutes per day.  He exercises with enough effort to increase his heart rate 4 days per week.   He is taking medications regularly.      Health Care Directive  Patient does not have a Health Care Directive: Discussed advance care planning with patient; however, patient declined at this time.      10/30/2024   General Health   How would you rate your overall physical health? Excellent   Feel stress (tense, anxious, or unable to sleep) Only a little      (!) STRESS CONCERN      10/30/2024   Nutrition   Three or more servings of calcium each day? Yes   Diet: Regular (no restrictions)   How many servings of fruit and vegetables per day? (!) 2-3   How many sweetened beverages each day? 0-1            10/30/2024   Exercise   Days per week of moderate/strenous exercise 6 days   Average minutes spent exercising at this level 60 min            10/30/2024   Social Factors   Frequency of gathering with friends or relatives Three times a week   Worry food won't last until get money to buy more No   Food not last or not have enough money for food? No   Do you have housing?  (Housing is defined as stable permanent housing and does not include staying ouside in a car, in a tent, in an abandoned building, in an overnight shelter, or couch-surfing.) Yes   Are you worried about losing your housing? No   Lack of transportation? No   Unable to get utilities (heat,electricity)? No            10/30/2024   Dental   Dentist two times every year? Yes            10/30/2024   TB Screening   Were you born outside of the [place]? No              Today's PHQ-2 Score:       7/15/2024     2:41 PM   PHQ-2 ( 1999 [employer])   Q1: Little interest or pleasure in doing things 0    Q2: Feeling down, depressed or hopeless 0    PHQ-2 Score 0   Q1: Little interest or pleasure in doing things Not at all   Q2: Feeling down, depressed or hopeless Not at all   PHQ-2 Score 0       Patient-reported         10/30/2024   Substance Use   Alcohol more than 3/day or more than 7/wk No   Do you use any other substances recreationally? (!) CANNABIS PRODUCTS        Social History     Tobacco Use    Smoking status: Never     Passive exposure: Never    Smokeless tobacco: Never   Vaping Use    Vaping status: Never Used   Substance Use Topics    Alcohol use: Yes     Alcohol/week: 5.0 standard drinks of alcohol    Drug use: Yes     Types: Marijuana     Comment: Drug use: Once a month           10/30/2024   STI Screening   New sexual partner(s) since last STI/HIV test? No      ASCVD Risk   The 10-year ASCVD risk score (Hafsa COX, et al., 2019) is: 2.1%    Values used to calculate the score:      Age: 44 years      Sex: Male      Is Non- : No      Diabetic: No      Tobacco smoker: No      Systolic Blood Pressure: 120 mmHg      Is BP treated: No      HDL Cholesterol: 59 mg/dL      Total Cholesterol: 292 mg/dL        10/30/2024   Contraception/Family Planning   Questions about contraception or family planning No           Reviewed and updated as needed this visit by Provider   Tobacco  Allergies  Meds   Problems  Med Hx  Surg Hx  Fam Hx            Lab work is in process  Labs reviewed in EPIC      Review of Systems  Constitutional, HEENT, cardiovascular, pulmonary, GI, , musculoskeletal, neuro, skin, endocrine and psych systems are negative, except as otherwise noted.     Objective    Exam  /84 (BP Location: Right arm, Patient Position: Sitting, Cuff Size: Adult Large)   Pulse 55   Temp 97.9  F (36.6  C) (Oral)   Resp 16   Ht 1.829 m (6')   Wt 84.4 kg (186 lb)   SpO2 95%   BMI 25.23 kg/m     Estimated body mass index is 25.23 kg/m  as calculated from the following:    Height as of this encounter: 1.829 m (6').    Weight as of this encounter: 84.4 kg (186 lb).    Physical Exam  GENERAL: alert and no distress  EYES: Eyes grossly normal to inspection, PERRL and conjunctivae and sclerae normal  HENT: ear canals and TM's normal, nose and mouth without ulcers or lesions  NECK: no adenopathy, no asymmetry, masses, or scars  RESP: lungs clear to auscultation - no rales, rhonchi or wheezes  CV: regular rate and rhythm, normal S1 S2, no S3 or S4, no murmur, click or rub, no peripheral edema  ABDOMEN: soft, nontender, no hepatosplenomegaly, no masses and bowel sounds normal  MS: no gross musculoskeletal defects noted, no edema  SKIN: no suspicious lesions or rashes  NEURO: Normal strength and tone, mentation intact and speech normal  PSYCH: mentation appears normal, affect normal/bright        Signed Electronically by: Nighat Ruby MD

## 2024-10-30 NOTE — PATIENT INSTRUCTIONS
Patient Education   Preventive Care Advice   This is general advice given by our system to help you stay healthy. However, your care team may have specific advice just for you. Please talk to your care team about your preventive care needs.  Nutrition  Eat 5 or more servings of fruits and vegetables each day.  Try wheat bread, brown rice and whole grain pasta (instead of white bread, rice, and pasta).  Get enough calcium and vitamin D. Check the label on foods and aim for 100% of the RDA (recommended daily allowance).  Lifestyle  Exercise at least 150 minutes each week  (30 minutes a day, 5 days a week).  Do muscle strengthening activities 2 days a week. These help control your weight and prevent disease.  No smoking.  Wear sunscreen to prevent skin cancer.  Have a dental exam and cleaning every 6 months.  Yearly exams  See your health care team every year to talk about:  Any changes in your health.  Any medicines your care team has prescribed.  Preventive care, family planning, and ways to prevent chronic diseases.  Shots (vaccines)   HPV shots (up to age 26), if you've never had them before.  Hepatitis B shots (up to age 59), if you've never had them before.  COVID-19 shot: Get this shot when it's due.  Flu shot: Get a flu shot every year.  Tetanus shot: Get a tetanus shot every 10 years.  Pneumococcal, hepatitis A, and RSV shots: Ask your care team if you need these based on your risk.  Shingles shot (for age 50 and up)  General health tests  Diabetes screening:  Starting at age 35, Get screened for diabetes at least every 3 years.  If you are younger than age 35, ask your care team if you should be screened for diabetes.  Cholesterol test: At age 39, start having a cholesterol test every 5 years, or more often if advised.  Bone density scan (DEXA): At age 50, ask your care team if you should have this scan for osteoporosis (brittle bones).  Hepatitis C: Get tested at least once in your life.  STIs (sexually  transmitted infections)  Before age 24: Ask your care team if you should be screened for STIs.  After age 24: Get screened for STIs if you're at risk. You are at risk for STIs (including HIV) if:  You are sexually active with more than one person.  You don't use condoms every time.  You or a partner was diagnosed with a sexually transmitted infection.  If you are at risk for HIV, ask about PrEP medicine to prevent HIV.  Get tested for HIV at least once in your life, whether you are at risk for HIV or not.  Cancer screening tests  Cervical cancer screening: If you have a cervix, begin getting regular cervical cancer screening tests starting at age 21.  Breast cancer scan (mammogram): If you've ever had breasts, begin having regular mammograms starting at age 40. This is a scan to check for breast cancer.  Colon cancer screening: It is important to start screening for colon cancer at age 45.  Have a colonoscopy test every 10 years (or more often if you're at risk) Or, ask your provider about stool tests like a FIT test every year or Cologuard test every 3 years.  To learn more about your testing options, visit:   .  For help making a decision, visit:   https://bit.ly/xl56833.  Prostate cancer screening test: If you have a prostate, ask your care team if a prostate cancer screening test (PSA) at age 55 is right for you.  Lung cancer screening: If you are a current or former smoker ages 50 to 80, ask your care team if ongoing lung cancer screenings are right for you.  For informational purposes only. Not to replace the advice of your health care provider. Copyright   2023 [facility]. All rights reserved. Clinically reviewed by the [facility]. [employer] [id number] - REV 01/24.  Substance Use Disorder: Care Instructions  Overview     You can improve your life and health by stopping your use of alcohol or drugs. When you don't drink or use drugs, you may feel and sleep better. You may  get along better with your family, friends, and coworkers. There are medicines and programs that can help with substance use disorder.  How can you care for yourself at home?  Here are some ways to help you stay sober and prevent relapse.  If you have been given medicine to help keep you sober or reduce your cravings, be sure to take it exactly as prescribed.  Talk to your doctor about programs that can help you stop using drugs or drinking alcohol.  Do not keep alcohol or drugs in your home.  Plan ahead. Think about what you'll say if other people ask you to drink or use drugs. Try not to spend time with people who drink or use drugs.  Use the time and money spent on drinking or drugs to do something that's important to you.  Preventing a relapse  Have a plan to deal with relapse. Learn to recognize changes in your thinking that lead you to drink or use drugs. Get help before you start to drink or use drugs again.  Try to stay away from situations, friends, or places that may lead you to drink or use drugs.  If you feel the need to drink alcohol or use drugs again, seek help right away. Call a trusted friend or family member. Some people get support from organizations such as Narcotics Anonymous or HealthyOut or from treatment facilities.  If you relapse, get help as soon as you can. Some people make a plan with another person that outlines what they want that person to do for them if they relapse. The plan usually includes how to handle the relapse and who to notify in case of relapse.  Don't give up. Remember that a relapse doesn't mean that you have failed. Use the experience to learn the triggers that lead you to drink or use drugs. Then quit again. Recovery is a lifelong process. Many people have several relapses before they are able to quit for good.  Follow-up care is a key part of your treatment and safety. Be sure to make and go to all appointments, and call your doctor if you are having problems. It's  "also a good idea to know your test results and keep a list of the medicines you take.  When should you call for help?   Call 911  anytime you think you may need emergency care. For example, call if you or someone else:    Has overdosed or has withdrawal signs. Be sure to tell the emergency workers that you are or someone else is using or trying to quit using drugs. Overdose or withdrawal signs may include:  Losing consciousness.  Seizure.  Seeing or hearing things that aren't there (hallucinations).     Is thinking or talking about suicide or harming others.   Where to get help 24 hours a day, 7 days a week   If you or someone you know talks about suicide, self-harm, a mental health crisis, a substance use crisis, or any other kind of emotional distress, get help right away. You can:    Call the Suicide and Crisis Lifeline at 988.     Call 7-114-197-TALK (1-747.351.3644).     Text HOME to 499607 to access the Crisis Text Line.   Consider saving these numbers in your phone.  Go to [employer] for more information or to chat online.  Call your doctor now or seek immediate medical care if:    You are having withdrawal symptoms. These may include nausea or vomiting, sweating, shakiness, and anxiety.   Watch closely for changes in your health, and be sure to contact your doctor if:    You have a relapse.     You need more help or support to stop.   Where can you learn more?  Go to https://www.ZigaVite.net/patiented  Enter H573 in the search box to learn more about \"Substance Use Disorder: Care Instructions.\"  Current as of: November 15, 2023  Content Version: 14.2 2024 [employer].   Care instructions adapted under license by your healthcare professional. If you have questions about a medical condition or this instruction, always ask your healthcare professional. [employer] disclaims any warranty or liability for your use of this information.       "

## 2024-10-31 ENCOUNTER — TRANSFERRED RECORDS (OUTPATIENT)
Dept: HEALTH INFORMATION MANAGEMENT | Facility: CLINIC | Age: 44
End: 2024-10-31
Payer: COMMERCIAL

## 2024-10-31 LAB
CHOLEST SERPL-MCNC: 195 MG/DL
FASTING STATUS PATIENT QL REPORTED: YES
HCV AB SERPL QL IA: NONREACTIVE
HDLC SERPL-MCNC: 53 MG/DL
LDLC SERPL CALC-MCNC: 119 MG/DL
NONHDLC SERPL-MCNC: 142 MG/DL
TRIGL SERPL-MCNC: 116 MG/DL
VIT D+METAB SERPL-MCNC: 34 NG/ML (ref 20–50)

## 2024-11-03 LAB — PYRIDOXAL PHOS SERPL-SCNC: 78.7 NMOL/L

## 2024-11-06 ENCOUNTER — TRANSFERRED RECORDS (OUTPATIENT)
Dept: HEALTH INFORMATION MANAGEMENT | Facility: CLINIC | Age: 44
End: 2024-11-06
Payer: COMMERCIAL

## 2024-12-16 ENCOUNTER — OFFICE VISIT (OUTPATIENT)
Dept: NEUROLOGY | Facility: CLINIC | Age: 44
End: 2024-12-16
Attending: PHYSICIAN ASSISTANT
Payer: COMMERCIAL

## 2024-12-16 VITALS — SYSTOLIC BLOOD PRESSURE: 134 MMHG | DIASTOLIC BLOOD PRESSURE: 79 MMHG | HEART RATE: 71 BPM

## 2024-12-16 DIAGNOSIS — R20.0 LEFT LEG NUMBNESS: ICD-10-CM

## 2024-12-16 DIAGNOSIS — R29.818 POSITIVE LHERMITTE'S SIGN: Primary | ICD-10-CM

## 2024-12-16 DIAGNOSIS — M79.662 PAIN OF LEFT LOWER LEG: ICD-10-CM

## 2024-12-16 NOTE — PROGRESS NOTES
DEPARTMENT OF NEUROLOGY  Referral for: left leg numbness, pain in left leg  Patient Name:  Eyad Venegas  MRN:  [id number]    :  1980  Date of Clinic Visit:  2024  Primary Care Provider:  Nighat Ruby  Referring Provider: Ivette Arcos PA-C    ASSESSMENT AND PLAN:  ***    Plan:  - ***    Patient was seen and discussed with Dr. Palomo.    IVAN TREJO MD  Neurology Resident, PGY2  [facility] Department of Neurology    ___________________________________________    CHIEF COMPLAINT: ***    HISTORY OF PRESENT ILLNESS:  Eyad Venegas is a  44 year old male presenting with ***.    Starting , had tingling sensation in medial left lower leg, numb/wet sensation in foot. Burning pain in medial leg    PHYSICAL EXAMINATION:  Vitals: There were no vitals taken for this visit.    General: ***    Neurologic Exam:  Mental status: Patient is oriented to person, place and time and able to provide a detailed account of history of illness. Attention and fund of knowledge are normal. Immediate recall is 4/4, and remote recall is ***    Language: Speech is fluent; comprehension, repetition and naming are normal.    Cranial nerves: Pupils are round and react normally to light and accommodation. Optic discs are sharp and flat and without pallor; retina appears normal without hemorrhages or exudates. Visual fields are full and extraocular movements are normal. Facial strength and sensation are normal. Hearing is normal to conversation. Palate rises symmetrically and there is no dysarthria. Tongue protrusion is normal.    Motor/Strength: Strength is ***. No tremors, myoclonus, or other abnormal movements. Muscle bulk and tone are normal.     Sensation: Sensation is intact to pin-prick and vibration in all four extremities.     Reflexes: Biceps, brachioradialis, triceps, patellae, and ankle jerks 2+ bilaterally. Plantar responses are flexor bilaterally.    Coordination:  Finger-to-nose and heel-to-shin maneuvers are symmetric and normal bilaterally. Rapid alternating movements are symmetric in the extremities. No pronator drift.    Gait: Gait is narrow based and steady and the patient is able to walk on heels, toes and in tandem. Romberg is negative.       INVESTIGATIONS:     EMG 11/7/24:  Normal EMG and nerve conduction studies.  There is no evidence for a neuropathy.  There is no evidence for a left lumbar radiculopathy.  EMG 8/6/24:  Nerve conduction studies of the left lower limb were normal. There were no conduction block or focal slowing noted on the left fibular or left tibial motor responses. Bilateral medial and lateral plantar sensory nerve action potentials were present and symmetric. Left tibial F-wave latency was within normal limits.       MR lumbar spine 10/7/24  1.  Minimal degenerative changes, as detailed above.   2.  No spinal canal or neural foraminal stenosis at any lumbar level.       ALLERGIES:  Allergies   Allergen Reactions    Seasonal Allergies Headache     MEDICATIONS:  Current Outpatient Medications   Medication Sig Dispense Refill    atorvastatin (LIPITOR) 20 MG tablet Take 1 tablet (20 mg) by mouth at bedtime. Need to see doctor for further refill 90 tablet 0    fexofenadine (ALLEGRA) 180 MG tablet Take 180 mg by mouth daily      gabapentin (NEURONTIN) 400 MG capsule TAKE 1 CAPSULE (400 MG) BY MOUTH THREE TIMES A DAY.       PAST MEDICAL HISTORY:  No past medical history on file.  PAST SURGICAL HISTORY:  Past Surgical History:   Procedure Laterality Date    HC EXPLORE UNDESC TESTIS,INGUIN/SCROTAL      Description: Surgery Testis Exploration Of Undescended Testis;  Recorded: 07/27/2010;  Comments: thinks R-when very young    MOLE REMOVAL       SOCIAL HISTORY:  Social History     Socioeconomic History    Marital status:      Spouse name: Not on file    Number of children: Not on file    Years of education: Not on file    Highest education level: Not  on file   Occupational History    Not on file   Tobacco Use    Smoking status: Never     Passive exposure: Never    Smokeless tobacco: Never   Vaping Use    Vaping status: Never Used   Substance and Sexual Activity    Alcohol use: Yes     Alcohol/week: 5.0 standard drinks of alcohol    Drug use: Yes     Types: Marijuana     Comment: Drug use: Once a month    Sexual activity: Yes     Partners: Female     Birth control/protection: Other   Other Topics Concern    Not on file   Social History Narrative    Not on file     Social Drivers of Health     Financial Resource Strain: Low Risk  (10/30/2024)    Financial Resource Strain     Within the past 12 months, have you or your family members you live with been unable to get utilities (heat, electricity) when it was really needed?: No   Food Insecurity: Low Risk  (10/30/2024)    Food Insecurity     Within the past 12 months, did you worry that your food would run out before you got money to buy more?: No     Within the past 12 months, did the food you bought just not last and you didn t have money to get more?: No   Transportation Needs: Low Risk  (10/30/2024)    Transportation Needs     Within the past 12 months, has lack of transportation kept you from medical appointments, getting your medicines, non-medical meetings or appointments, work, or from getting things that you need?: No   Physical Activity: Sufficiently Active (10/30/2024)    Exercise Vital Sign     Days of Exercise per Week: 6 days     Minutes of Exercise per Session: 60 min   Stress: No Stress Concern Present (10/30/2024)    [place] [employer] - Occupational Stress Questionnaire     Feeling of Stress : Only a little   Social Connections: Unknown (10/30/2024)    Social Connection and Isolation Panel [NHANES]     Frequency of Communication with Friends and Family: Not on file     Frequency of Social Gatherings with Friends and Family: Three times a week     Attends [employer] Services: Not  on file     Active Member of Clubs or Organizations: Not on file     Attends Club or Organization Meetings: Not on file     Marital Status: Not on file   Interpersonal Safety: Low Risk  (7/15/2024)    Interpersonal Safety     Do you feel physically and emotionally safe where you currently live?: Yes     Within the past 12 months, have you been hit, slapped, kicked or otherwise physically hurt by someone?: No     Within the past 12 months, have you been humiliated or emotionally abused in other ways by your partner or ex-partner?: No   Housing Stability: Low Risk  (10/30/2024)    Housing Stability     Do you have housing? : Yes     Are you worried about losing your housing?: No     FAMILY HISTORY:  Family History   Problem Relation Age of Onset    Rheumatoid Arthritis Mother     Hyperlipidemia Father     Attention Deficit Disorder Sister

## 2024-12-16 NOTE — LETTER
2024      Eyad Venegas  [address]  [facility] [address]      Dear Colleague,    Thank you for referring your patient, Eyad Venegas, to the [facility]UROLOGY [facility]IC [facility] [place]. Please see a copy of my visit note below.      DEPARTMENT OF NEUROLOGY  Referral for: left leg numbness, pain in left leg  Patient Name:  Eyad Venegas  MRN:  [id number]    :  1980  Date of Clinic Visit:  2024  Primary Care Provider:  Nighat Ruby  Referring Provider: Ivette Arcos PA-C    ASSESSMENT AND PLAN:  Eyad Venegas is a 44 year old male with no significant past medical history who is presenting with abnormal sensation of his left leg, positive Lhermitte's sign, and report of abnormal lesion in thoracic spine and possible lesion in MRI brain, although we were not able to visualize these images today and only have a reports currently.  On exam, he has intact sensation, positive Lhermitte's sign, and slightly brisk but symmetric reflexes.  He has been working with an outside neurologist for further evaluation and has a lumbar puncture scheduled for Thursday.  We discussed with the patient that given his history, exam, and the reported imaging findings, it is possible that his symptoms could be in the setting of multiple sclerosis or clinically isolated syndrome, although we are not able to definitively make a comment on this at this time as we do not have the images to review ourselves.  We discussed that we agree with the further workup with lumbar puncture and we will work to obtain the images that were done at an outside facility.  We discussed that our plan would be to review the images and monitor for the results of the lumbar puncture and make further recommendations at that time.  The patient expressed understanding and was in agreement with the plan.    Plan:  - we are working to obtain outside images and will review when available  - agree  with LP with oligoclonal bands as already ordered  - CDS1 serum panel ordered    Follow up with me in clinic on 3/10/25, additionally pending results of LP and review of images, plan to reach out to the patient sooner with further recommendations.     Patient was seen and discussed with Dr. Palomo.    IVAN TREJO MD  Neurology Resident, PGY2  [facility] Department of Neurology    ___________________________________________    CHIEF COMPLAINT: Abnormal sensation of left leg    HISTORY OF PRESENT ILLNESS:  Eyad Venegas is a  44 year old male no significant past medical history presenting with abnormal sensation of his left leg.  He shared that when he woke up on July 7, he had a tingling sensation in his left lower leg and a dull sensation in his left foot.  The symptoms involves his entire lower leg and his entire foot.  The symptoms subsided that day but then returned when he woke up the next day.  His symptoms persisted but did not change.  Then in August, the tingling sensation became more predominant and he would have intermittent sharp localized pain in his calf and shin that would change locations.  Since that time, he does feel like he has had progression of his symptoms, where he will have intermittent abnormal sensations in his left leg, sometimes in his thigh but to the persistent symptoms that he has at all times is an abnormal dull sensation in his left foot where it feels like it is wet and tingling in his left lower leg.  He describes the tingling as the sensation of static on the TV.  About a month ago, he started noticing that when he bends his neck down, he has an abnormal sensation starting at his left hip that goes down his thigh, and describes that it feels like an abnormal warm sensation.  This happens each time he bends his neck down.    There was no clear inciting factor.  He noted that 2 weeks prior to the onset of the symptoms he was in [place] and was in tall grass  but did not have a notable bug bite.  About 2 or 3 days after his symptoms started he was at a cabin and had a bug bite on his ankle, and notes that he reacts strongly to bug bites but it was not beyond his normal reaction.  He is not sure what kind of bug bit him.    He has intermittently taken gabapentin which takes the edge off, but he does not use it consistently as sometimes the symptoms are not particularly painful.    He describes the symptoms are only sensory.  He has no issues with weakness or balance.  He has not had any issues with coordination.  He has tried to push himself when he is working out to see if that will affect his symptoms and he has not had any issues with weakness, balance or coordination even after working out.    He has not had any bowel or bladder issues.  No fevers, weight loss, or other issues.  He has not had increased fatigue recently.    He has not had any vision changes, trouble with speech or trouble swallowing, headaches, dizziness, weakness, balance issues, or falls.    He is right-handed.  He works in technology sales and travels frequently.  No history of autoimmune conditions.  No family history of neurologic conditions.  His mother has rheumatoid arthritis.    He does not smoke, he drinks about 4 drinks per week, has occasional THC edibles but not recently.  He does not use whippets or other substances.    PHYSICAL EXAMINATION:  Vitals: /79 (BP Location: Right arm, Patient Position: Sitting, Cuff Size: Adult Regular)   Pulse 71     General: sitting in chair, in no acute distress    Neurologic Exam:  Mental status: Patient is oriented to person, place and time and able to provide a detailed account of history of illness. Attention and fund of knowledge are normal.     Language: Speech is fluent; comprehension, repetition and naming are normal.    Cranial nerves: Pupils are round and react normally to light and accommodation. Visual fields are full and extraocular  movements are normal. Facial strength and sensation are normal. Hearing is normal to conversation. Palate rises symmetrically and there is no dysarthria. Tongue protrusion is normal.    Motor/Strength: Strength is 5/5 in shoulder abduction, elbow flexion/extension, wrist flexion/extension, finger flexion/extension and thumb abduction bilaterally. 5/5 in hip flexion, knee flexion/extension, dorsiflexion, plantarflexion, foot eversion and inversion bilaterally. No tremors, myoclonus, or other abnormal movements. Muscle bulk and tone are normal.     Sensation: Sensation is intact to pin-prick, vibration, temperature in all four extremities. Has abnormal sensation in his left lateral thigh when he bends his neck down.     Reflexes: Biceps, brachioradialis, triceps 2+ bilaterally. Patellae and achilles 3+ but symmetric bilaterally. Plantar responses are mute bilaterally.    Coordination: Finger-to-nose and heel-to-shin maneuvers are symmetric and normal bilaterally. Rapid alternating movements are symmetric in the extremities. No pronator drift.    Gait: Gait is narrow based and steady and the patient is able to walk on heels, toes and in tandem forwards and backwards.  Romberg is negative.       INVESTIGATIONS:     EMG 11/7/24:  Normal EMG and nerve conduction studies.  There is no evidence for a neuropathy.  There is no evidence for a left lumbar radiculopathy.    EMG 8/6/24:  Nerve conduction studies of the left lower limb were normal. There were no conduction block or focal slowing noted on the left fibular or left tibial motor responses. Bilateral medial and lateral plantar sensory nerve action potentials were present and symmetric. Left tibial F-wave latency was within normal limits.     Of note, all MRI were not able to be reviewed today, only report was available.     MR lumbar spine 10/7/24  1.  Minimal degenerative changes, as detailed above.   2.  No spinal canal or neural foraminal stenosis at any lumbar  level.     MR brain 11/7/24  1.  No acute intracranial finding.     2.  Small isolated focus of T2 hyperintense signal in the right periatrial white matter, nonspecific. This is probably gliosis from a remote ischemic or inflammatory condition. Demyelination also in the broad differential diagnosis. Lesion is not mass-like.     MR cervical spine 11/7/24  Mild cervical disc and facet degeneration.     1.  No significant spinal canal narrowing at any level.     2.  Neural foraminal stenosis is greatest on the right at C5-6, moderate. There is also mild foraminal narrowing on the right at C3-4, on the left at C5-6, and on the right at C6-7.     3.  Normal cervical spinal cord.     MR thoracic spine 12/8/24  1.  Solitary intramedullary T2 hyperintense lesion to the right of midline at T2-3 is suspected, which could reflect focus of chronic demyelination or other inflammatory myelitis. Secondary consideration of gliosis from other post inflammatory or ischemic process. Spinal glioma is unlikely       ALLERGIES:  Allergies   Allergen Reactions     Seasonal Allergies Headache     MEDICATIONS:  Current Outpatient Medications   Medication Sig Dispense Refill     atorvastatin (LIPITOR) 20 MG tablet Take 1 tablet (20 mg) by mouth at bedtime. Need to see doctor for further refill 90 tablet 0     gabapentin (NEURONTIN) 400 MG capsule TAKE 1 CAPSULE (400 MG) BY MOUTH THREE TIMES A DAY.       fexofenadine (ALLEGRA) 180 MG tablet Take 180 mg by mouth daily (Patient not taking: Reported on 12/16/2024)       PAST MEDICAL HISTORY:  No past medical history on file.  PAST SURGICAL HISTORY:  Past Surgical History:   Procedure Laterality Date     HC EXPLORE UNDESC TESTIS,INGUIN/SCROTAL      Description: Surgery Testis Exploration Of Undescended Testis;  Recorded: 07/27/2010;  Comments: thinks R-when very young     MOLE REMOVAL       SOCIAL HISTORY:  Social History     Socioeconomic History     Marital status:      Spouse name: Not  on file     Number of children: Not on file     Years of education: Not on file     Highest education level: Not on file   Occupational History     Not on file   Tobacco Use     Smoking status: Never     Passive exposure: Never     Smokeless tobacco: Never   Vaping Use     Vaping status: Never Used   Substance and Sexual Activity     Alcohol use: Yes     Alcohol/week: 5.0 standard drinks of alcohol     Drug use: Yes     Types: Marijuana     Comment: Drug use: Once a month     Sexual activity: Yes     Partners: Female     Birth control/protection: Other   Other Topics Concern     Not on file   Social History Narrative     Not on file     Social Drivers of Health     Financial Resource Strain: Low Risk  (10/30/2024)    Financial Resource Strain      Within the past 12 months, have you or your family members you live with been unable to get utilities (heat, electricity) when it was really needed?: No   Food Insecurity: Low Risk  (10/30/2024)    Food Insecurity      Within the past 12 months, did you worry that your food would run out before you got money to buy more?: No      Within the past 12 months, did the food you bought just not last and you didn t have money to get more?: No   Transportation Needs: Low Risk  (10/30/2024)    Transportation Needs      Within the past 12 months, has lack of transportation kept you from medical appointments, getting your medicines, non-medical meetings or appointments, work, or from getting things that you need?: No   Physical Activity: Sufficiently Active (10/30/2024)    Exercise Vital Sign      Days of Exercise per Week: 6 days      Minutes of Exercise per Session: 60 min   Stress: No Stress Concern Present (10/30/2024)    [employer] - Occupational Stress Questionnaire      Feeling of Stress : Only a little   Social Connections: Unknown (10/30/2024)    Social Connection and Isolation Panel [NHANES]      Frequency of Communication with Friends and  Family: Not on file      Frequency of Social Gatherings with Friends and Family: Three times a week      Attends L[employer] Services: Not on file      Active Member of Clubs or Organizations: Not on file      Attends Club or Organization Meetings: Not on file      Marital Status: Not on file   Interpersonal Safety: Low Risk  (7/15/2024)    Interpersonal Safety      Do you feel physically and emotionally safe where you currently live?: Yes      Within the past 12 months, have you been hit, slapped, kicked or otherwise physically hurt by someone?: No      Within the past 12 months, have you been humiliated or emotionally abused in other ways by your partner or ex-partner?: No   Housing Stability: Low Risk  (10/30/2024)    Housing Stability      Do you have housing? : Yes      Are you worried about losing your housing?: No     FAMILY HISTORY:  Family History   Problem Relation Age of Onset     Rheumatoid Arthritis Mother      Hyperlipidemia Father      Attention Deficit Disorder Sister        Attestation signed by Emy Palomo MD at 12/19/2024  7:42 PM:  I personally saw and evaluated Mr. Venegas with Dr. Singer on the date of service.  I have reviewed the above documentation and agree with the findings and recommendations.     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  MRI with demyelinating lesion in the thoracic spine. Additional lesion off the atrium ?periventricular.     I need to personally review the images before commenting on diagnosis but pattern of symptoms and description of MRI findings is highly typical of multiple sclerosis, first attack. He might only meet criteria for clinically isolated syndrome.     Csf results will aid in diagnosis.     If MS confirmed with CSF< then could refer to pharmacy to discuss treatment as next available follow up is in march     Discussed how best resource for information in MS society webpage   A total of 70 minutes were personally  spent in the care of this patient on the date of service.     Emy Palomo MD on 12/19/2024 at 7:40 PM      Again, thank you for allowing me to participate in the care of your patient.        Sincerely,        IVAN TREJO MD

## 2024-12-16 NOTE — PATIENT INSTRUCTIONS
Today we discussed your sensory symptoms in your left leg. Based on your symptoms and imaging report, there is concern for a possible demyelinating process, such as clinically isolated symtpoms or multiple sclerosis, but to be able to definitively comment on it, we need to obtain the images.     We recommend obtaining a serum test that looks for antibodies in the blood.   Proceed with the plan to get a lumbar puncture    Follow up with me in about 3 months to discuss results.

## 2024-12-16 NOTE — NURSING NOTE
Chief Complaint   Patient presents with    Consult For     Numbness in left leg  Has seen multiple Neurologists  Having a spinal tap on Monday

## 2024-12-16 NOTE — PROGRESS NOTES
DEPARTMENT OF NEUROLOGY  Referral for: left leg numbness, pain in left leg  Patient Name:  Eyad Venegas  MRN:  [id number]    :  1980  Date of Clinic Visit:  2024  Primary Care Provider:  Nighat Ruby  Referring Provider: Ivette Arcos PA-C    ASSESSMENT AND PLAN:  Eyad Venegas is a 44 year old male with no significant past medical history who is presenting with abnormal sensation of his left leg, positive Lhermitte's sign, and report of abnormal lesion in thoracic spine and possible lesion in MRI brain, although we were not able to visualize these images today and only have a reports currently.  On exam, he has intact sensation, positive Lhermitte's sign, and slightly brisk but symmetric reflexes.  He has been working with an outside neurologist for further evaluation and has a lumbar puncture scheduled for Thursday.  We discussed with the patient that given his history, exam, and the reported imaging findings, it is possible that his symptoms could be in the setting of multiple sclerosis or clinically isolated syndrome, although we are not able to definitively make a comment on this at this time as we do not have the images to review ourselves.  We discussed that we agree with the further workup with lumbar puncture and we will work to obtain the images that were done at an outside facility.  We discussed that our plan would be to review the images and monitor for the results of the lumbar puncture and make further recommendations at that time.  The patient expressed understanding and was in agreement with the plan.    Plan:  - we are working to obtain outside images and will review when available  - agree with LP with oligoclonal bands as already ordered  - CDS1 serum panel ordered    Follow up with me in clinic on 3/10/25, additionally pending results of LP and review of images, plan to reach out to the patient sooner with further recommendations.     Patient was seen  and discussed with Dr. Palomo.    IVAN TREJO MD  Neurology Resident, PGY2  [facility] Department of Neurology    ___________________________________________    CHIEF COMPLAINT: Abnormal sensation of left leg    HISTORY OF PRESENT ILLNESS:  Eyad Venegas is a  44 year old male no significant past medical history presenting with abnormal sensation of his left leg.  He shared that when he woke up on July 7, he had a tingling sensation in his left lower leg and a dull sensation in his left foot.  The symptoms involves his entire lower leg and his entire foot.  The symptoms subsided that day but then returned when he woke up the next day.  His symptoms persisted but did not change.  Then in August, the tingling sensation became more predominant and he would have intermittent sharp localized pain in his calf and shin that would change locations.  Since that time, he does feel like he has had progression of his symptoms, where he will have intermittent abnormal sensations in his left leg, sometimes in his thigh but to the persistent symptoms that he has at all times is an abnormal dull sensation in his left foot where it feels like it is wet and tingling in his left lower leg.  He describes the tingling as the sensation of static on the TV.  About a month ago, he started noticing that when he bends his neck down, he has an abnormal sensation starting at his left hip that goes down his thigh, and describes that it feels like an abnormal warm sensation.  This happens each time he bends his neck down.    There was no clear inciting factor.  He noted that 2 weeks prior to the onset of the symptoms he was in [place] and was in tall grass but did not have a notable bug bite.  About 2 or 3 days after his symptoms started he was at a cabin and had a bug bite on his ankle, and notes that he reacts strongly to bug bites but it was not beyond his normal reaction.  He is not sure what kind of bug bit  him.    He has intermittently taken gabapentin which takes the edge off, but he does not use it consistently as sometimes the symptoms are not particularly painful.    He describes the symptoms are only sensory.  He has no issues with weakness or balance.  He has not had any issues with coordination.  He has tried to push himself when he is working out to see if that will affect his symptoms and he has not had any issues with weakness, balance or coordination even after working out.    He has not had any bowel or bladder issues.  No fevers, weight loss, or other issues.  He has not had increased fatigue recently.    He has not had any vision changes, trouble with speech or trouble swallowing, headaches, dizziness, weakness, balance issues, or falls.    He is right-handed.  He works in technology sales and travels frequently.  No history of autoimmune conditions.  No family history of neurologic conditions.  His mother has rheumatoid arthritis.    He does not smoke, he drinks about 4 drinks per week, has occasional THC edibles but not recently.  He does not use whippets or other substances.    PHYSICAL EXAMINATION:  Vitals: /79 (BP Location: Right arm, Patient Position: Sitting, Cuff Size: Adult Regular)   Pulse 71     General: sitting in chair, in no acute distress    Neurologic Exam:  Mental status: Patient is oriented to person, place and time and able to provide a detailed account of history of illness. Attention and fund of knowledge are normal.     Language: Speech is fluent; comprehension, repetition and naming are normal.    Cranial nerves: Pupils are round and react normally to light and accommodation. Visual fields are full and extraocular movements are normal. Facial strength and sensation are normal. Hearing is normal to conversation. Palate rises symmetrically and there is no dysarthria. Tongue protrusion is normal.    Motor/Strength: Strength is 5/5 in shoulder abduction, elbow flexion/extension,  wrist flexion/extension, finger flexion/extension and thumb abduction bilaterally. 5/5 in hip flexion, knee flexion/extension, dorsiflexion, plantarflexion, foot eversion and inversion bilaterally. No tremors, myoclonus, or other abnormal movements. Muscle bulk and tone are normal.     Sensation: Sensation is intact to pin-prick, vibration, temperature in all four extremities. Has abnormal sensation in his left lateral thigh when he bends his neck down.     Reflexes: Biceps, brachioradialis, triceps 2+ bilaterally. Patellae and achilles 3+ but symmetric bilaterally. Plantar responses are mute bilaterally.    Coordination: Finger-to-nose and heel-to-shin maneuvers are symmetric and normal bilaterally. Rapid alternating movements are symmetric in the extremities. No pronator drift.    Gait: Gait is narrow based and steady and the patient is able to walk on heels, toes and in tandem forwards and backwards.  Romberg is negative.       INVESTIGATIONS:     EMG 11/7/24:  Normal EMG and nerve conduction studies.  There is no evidence for a neuropathy.  There is no evidence for a left lumbar radiculopathy.    EMG 8/6/24:  Nerve conduction studies of the left lower limb were normal. There were no conduction block or focal slowing noted on the left fibular or left tibial motor responses. Bilateral medial and lateral plantar sensory nerve action potentials were present and symmetric. Left tibial F-wave latency was within normal limits.     Of note, all MRI were not able to be reviewed today, only report was available.     MR lumbar spine 10/7/24  1.  Minimal degenerative changes, as detailed above.   2.  No spinal canal or neural foraminal stenosis at any lumbar level.     MR brain 11/7/24  1.  No acute intracranial finding.     2.  Small isolated focus of T2 hyperintense signal in the right periatrial white matter, nonspecific. This is probably gliosis from a remote ischemic or inflammatory condition. Demyelination also in the  broad differential diagnosis. Lesion is not mass-like.     MR cervical spine 11/7/24  Mild cervical disc and facet degeneration.     1.  No significant spinal canal narrowing at any level.     2.  Neural foraminal stenosis is greatest on the right at C5-6, moderate. There is also mild foraminal narrowing on the right at C3-4, on the left at C5-6, and on the right at C6-7.     3.  Normal cervical spinal cord.     MR thoracic spine 12/8/24  1.  Solitary intramedullary T2 hyperintense lesion to the right of midline at T2-3 is suspected, which could reflect focus of chronic demyelination or other inflammatory myelitis. Secondary consideration of gliosis from other post inflammatory or ischemic process. Spinal glioma is unlikely       ALLERGIES:  Allergies   Allergen Reactions    Seasonal Allergies Headache     MEDICATIONS:  Current Outpatient Medications   Medication Sig Dispense Refill    atorvastatin (LIPITOR) 20 MG tablet Take 1 tablet (20 mg) by mouth at bedtime. Need to see doctor for further refill 90 tablet 0    gabapentin (NEURONTIN) 400 MG capsule TAKE 1 CAPSULE (400 MG) BY MOUTH THREE TIMES A DAY.      fexofenadine (ALLEGRA) 180 MG tablet Take 180 mg by mouth daily (Patient not taking: Reported on 12/16/2024)       PAST MEDICAL HISTORY:  No past medical history on file.  PAST SURGICAL HISTORY:  Past Surgical History:   Procedure Laterality Date    HC EXPLORE UNDESC TESTIS,INGUIN/SCROTAL      Description: Surgery Testis Exploration Of Undescended Testis;  Recorded: 07/27/2010;  Comments: thinks R-when very young    MOLE REMOVAL       SOCIAL HISTORY:  Social History     Socioeconomic History    Marital status:      Spouse name: Not on file    Number of children: Not on file    Years of education: Not on file    Highest education level: Not on file   Occupational History    Not on file   Tobacco Use    Smoking status: Never     Passive exposure: Never    Smokeless tobacco: Never   Vaping Use    Vaping  status: Never Used   Substance and Sexual Activity    Alcohol use: Yes     Alcohol/week: 5.0 standard drinks of alcohol    Drug use: Yes     Types: Marijuana     Comment: Drug use: Once a month    Sexual activity: Yes     Partners: Female     Birth control/protection: Other   Other Topics Concern    Not on file   Social History Narrative    Not on file     Social Drivers of Health     Financial Resource Strain: Low Risk  (10/30/2024)    Financial Resource Strain     Within the past 12 months, have you or your family members you live with been unable to get utilities (heat, electricity) when it was really needed?: No   Food Insecurity: Low Risk  (10/30/2024)    Food Insecurity     Within the past 12 months, did you worry that your food would run out before you got money to buy more?: No     Within the past 12 months, did the food you bought just not last and you didn t have money to get more?: No   Transportation Needs: Low Risk  (10/30/2024)    Transportation Needs     Within the past 12 months, has lack of transportation kept you from medical appointments, getting your medicines, non-medical meetings or appointments, work, or from getting things that you need?: No   Physical Activity: Sufficiently Active (10/30/2024)    Exercise Vital Sign     Days of Exercise per Week: 6 days     Minutes of Exercise per Session: 60 min   Stress: No Stress Concern Present (10/30/2024)    [employer] [place] [employer] - Occupational Stress Questionnaire     Feeling of Stress : Only a little   Social Connections: Unknown (10/30/2024)    Social Connection and Isolation Panel [NHANES]     Frequency of Communication with Friends and Family: Not on file     Frequency of Social Gatherings with Friends and Family: Three times a week     Attends [employer] Services: Not on file     Active Member of Clubs or Organizations: Not on file     Attends Club or Organization Meetings: Not on file     Marital Status: Not on file    Interpersonal Safety: Low Risk  (7/15/2024)    Interpersonal Safety     Do you feel physically and emotionally safe where you currently live?: Yes     Within the past 12 months, have you been hit, slapped, kicked or otherwise physically hurt by someone?: No     Within the past 12 months, have you been humiliated or emotionally abused in other ways by your partner or ex-partner?: No   Housing Stability: Low Risk  (10/30/2024)    Housing Stability     Do you have housing? : Yes     Are you worried about losing your housing?: No     FAMILY HISTORY:  Family History   Problem Relation Age of Onset    Rheumatoid Arthritis Mother     Hyperlipidemia Father     Attention Deficit Disorder Sister

## 2024-12-19 ENCOUNTER — HOSPITAL ENCOUNTER (OUTPATIENT)
Facility: CLINIC | Age: 44
Discharge: HOME | End: 2024-12-19
Payer: COMMERCIAL

## 2024-12-19 ENCOUNTER — HOSPITAL ENCOUNTER (OUTPATIENT)
Dept: GENERAL RADIOLOGY | Facility: CLINIC | Age: 44
Discharge: HOME | End: 2024-12-19
Attending: REGISTERED NURSE
Payer: COMMERCIAL

## 2024-12-19 VITALS
DIASTOLIC BLOOD PRESSURE: 68 MMHG | TEMPERATURE: 97 F | HEART RATE: 69 BPM | SYSTOLIC BLOOD PRESSURE: 120 MMHG | OXYGEN SATURATION: 99 % | RESPIRATION RATE: 18 BRPM

## 2024-12-19 DIAGNOSIS — R20.2 NUMBNESS AND TINGLING OF LEFT LOWER EXTREMITY: Primary | ICD-10-CM

## 2024-12-19 DIAGNOSIS — R20.0 NUMBNESS AND TINGLING OF LEFT LOWER EXTREMITY: Primary | ICD-10-CM

## 2024-12-19 DIAGNOSIS — R20.2 NUMBNESS AND TINGLING OF LEFT LOWER EXTREMITY: ICD-10-CM

## 2024-12-19 DIAGNOSIS — M79.662 PAIN OF LEFT LOWER LEG: ICD-10-CM

## 2024-12-19 DIAGNOSIS — R20.0 NUMBNESS AND TINGLING OF LEFT LOWER EXTREMITY: ICD-10-CM

## 2024-12-19 DIAGNOSIS — R93.7 ABNORMAL MRI, THORACIC SPINE: ICD-10-CM

## 2024-12-19 DIAGNOSIS — R20.0 LEFT LEG NUMBNESS: ICD-10-CM

## 2024-12-19 DIAGNOSIS — R29.818 POSITIVE LHERMITTE'S SIGN: ICD-10-CM

## 2024-12-19 LAB
APPEARANCE CSF: CLEAR
COLOR CSF: COLORLESS
LYMPH ABN NFR CSF MANUAL: 87 %
Lab: NORMAL
MONOS+MACROS NFR CSF MANUAL: 8 %
NEUTROPHILS NFR CSF MANUAL: 5 %
PERFORMING LABORATORY: NORMAL
PROT CSF-MCNC: 29.8 MG/DL (ref 15–45)
RBC # CSF MANUAL: 9 /UL (ref 0–2)
SPECIMEN STATUS: NORMAL
TEST NAME: NORMAL
TUBE # CSF: 4
WBC # CSF MANUAL: 6 /UL (ref 0–5)

## 2024-12-19 PROCEDURE — 89050 BODY FLUID CELL COUNT: CPT | Performed by: PSYCHIATRY & NEUROLOGY

## 2024-12-19 PROCEDURE — 87798 DETECT AGENT NOS DNA AMP: CPT | Performed by: PSYCHIATRY & NEUROLOGY

## 2024-12-19 PROCEDURE — 83873 ASSAY OF CSF PROTEIN: CPT | Performed by: PSYCHIATRY & NEUROLOGY

## 2024-12-19 PROCEDURE — 36415 COLL VENOUS BLD VENIPUNCTURE: CPT

## 2024-12-19 PROCEDURE — 82164 ANGIOTENSIN I ENZYME TEST: CPT | Performed by: PSYCHIATRY & NEUROLOGY

## 2024-12-19 PROCEDURE — 82784 ASSAY IGA/IGD/IGG/IGM EACH: CPT | Performed by: PSYCHIATRY & NEUROLOGY

## 2024-12-19 PROCEDURE — 86592 SYPHILIS TEST NON-TREP QUAL: CPT | Performed by: PSYCHIATRY & NEUROLOGY

## 2024-12-19 PROCEDURE — 250N000009 HC RX 250: Performed by: REGISTERED NURSE

## 2024-12-19 PROCEDURE — 86617 LYME DISEASE ANTIBODY: CPT | Performed by: PSYCHIATRY & NEUROLOGY

## 2024-12-19 PROCEDURE — 62328 DX LMBR SPI PNXR W/FLUOR/CT: CPT

## 2024-12-19 PROCEDURE — 84157 ASSAY OF PROTEIN OTHER: CPT | Performed by: PSYCHIATRY & NEUROLOGY

## 2024-12-19 PROCEDURE — 36415 COLL VENOUS BLD VENIPUNCTURE: CPT | Performed by: PSYCHIATRY & NEUROLOGY

## 2024-12-19 RX ADMIN — LIDOCAINE HYDROCHLORIDE 3 ML: 10 INJECTION, SOLUTION EPIDURAL; INFILTRATION; INTRACAUDAL; PERINEURAL at 14:30

## 2024-12-19 ASSESSMENT — ACTIVITIES OF DAILY LIVING (ADL)
ADLS_ACUITY_SCORE: 41

## 2024-12-19 NOTE — PROGRESS NOTES
Care Suites Post Procedure Note    Patient Information  Name: Eyad Venegas  Age: 44 year old    Post Procedure  Time patient returned to Care Suites: 1450  Concerns/abnormal assessment: no  If abnormal assessment, provider notified: N/A  Plan/Other: monitor until discharge    Mukul Chaidez RN

## 2024-12-19 NOTE — DISCHARGE INSTRUCTIONS
Lumbar Puncture Discharge Instructions     After you go home:    You may resume your normal diet  Continue to drink at least 8 ounces of fluid every 1-2 hours until bedtime tonight and continue to drink extra fluids for the next 2 days  Caffeinated beverages may help prevent or reduce spinal headaches    Care of Puncture Site:    If there is a bandaid - you may remove it tomorrow morning  You may shower tomorrow  No tub baths, whirlpools or swimming pool for 48 hours  Use ice packs as needed for discomfort     Activity - to help prevent spinal headache or spinal fluid leakage:    Minimize your activity today. Bedrest - lying flat - is recommended for the rest of the day to prevent or decrease the chance of getting a spinal headache.  You can be up to the bathroom and for meals.  Resume normal activities tomorrow.   Avoid strenuous activity for the next 2 days  Do not drive a vehicle until tomorrow     Medicines:    You may resume all medications, including blood thinners  For minor discomfort, you may take Acetaminophen (Tylenol) or Ibuprofen (Advil)            Call the doctor who ordered this procedure if:    Your headache becomes worse or is severe. (A minor headache is not unusual)  You have nausea or vomiting  The site is red, swollen, hot or tender  You have chills or a fever greater than 101 F (38 C)  Increase in pain, weakness or numbness  Stiff neck  Any questions or concerns    What to watch for:    A spinal headache can develop after this type of procedure.  It is described as a dull, throbbing headache, usually appearing within 48 hours after the procedure, that worsens when you are sitting upright or standing, but improves or goes away when you lie down. Most spinal headaches resolve on their own.  If you believe that you may be experiencing a spinal headache, continue bedrest for 2-4 hours and drink plenty of fluids.  If your symptoms persist for 24 hours or more, call your doctor who ordered your  procedure.  It can be normal to have some bruising or slight swelling at the injection site.      If you have questions or concerns call:            [facility] Radiology Dept @ [phone]                   between 8am-4:30pm Mon-Fri    If you have urgent questions outside of these normal business hours,   please contact the [facility] on call doctor @ [phone]    Or you can contact your provider via My Chart

## 2024-12-19 NOTE — DISCHARGE SUMMARY
Care Suites Discharge Nursing Note    Patient Information  Name: Eyad Venegas  Age: 44 year old    Discharge Education:  Discharge instructions reviewed: Yes  Additional education/resources provided: no  Patient/patient representative verbalizes understanding: Yes  Patient discharging on new medications: No  Medication education completed: N/A    Discharge Plans:   Discharge location: home  Discharge ride contacted: Yes  Approximate discharge time: 1600    Discharge Criteria:  Discharge criteria met and vital signs stable: Yes    Patient Belongs:  Patient belongings returned to patient: Yes    Mukul Chaidez RN

## 2024-12-19 NOTE — PROCEDURES
RADIOLOGY PROCEDURE NOTE  Patient name: Eyad Venegas  MRN: [id number]  : 1980    Pre-procedure diagnosis: Numbness with abnormal MRI  Post-procedure diagnosis: Same    Procedure Date/Time: 2024  3:16 PM  Procedure: LP at L3-L4  Estimated blood loss: None  Specimen(s) collected with description: 12 ml of clear CSF  The patient tolerated the procedure well with no immediate complications.  Significant findings:none    See imaging dictation for procedural details.    Provider name: Abelardo Gore PA-C  Assistant(s):None

## 2024-12-21 LAB
ALB CSF/SERPL: 4.2 RATIO
ALBUMIN CSF-MCNC: 19 MG/DL
ALBUMIN SERPL-MCNC: 4545 MG/DL
B BURGDOR IGG CSF QL IB: NEGATIVE
B BURGDOR IGM CSF QL IB: NEGATIVE
IGG CSF-MCNC: 2.8 MG/DL
IGG SERPL-MCNC: 1208 MG/DL
IGG SYNTH RATE SER+CSF CALC-MRATE: <0 MG/D
IGG/ALB CLEAR SER+CSF-RTO: 0.55 RATIO
IGG/ALB CSF: 0.15 RATIO
MBP CSF-MCNC: 4.82 NG/ML
OLIGOCLONAL BANDS CSF ELPH-IMP: ABNORMAL
OLIGOCLONAL BANDS CSF ELPH-IMP: POSITIVE
OLIGOCLONAL BANDS CSF IEF: 3 BANDS

## 2024-12-22 LAB
EBV DNA SPEC QL NAA+PROBE: NOT DETECTED
VDRL CSF QL: NON REACTIVE
WNV RNA SPEC QL NAA+PROBE: NOT DETECTED

## 2024-12-23 ENCOUNTER — TELEPHONE (OUTPATIENT)
Dept: NEUROLOGY | Facility: CLINIC | Age: 44
End: 2024-12-23
Payer: COMMERCIAL

## 2024-12-23 ENCOUNTER — MYC MEDICAL ADVICE (OUTPATIENT)
Dept: NEUROLOGY | Facility: CLINIC | Age: 44
End: 2024-12-23
Payer: COMMERCIAL

## 2024-12-23 DIAGNOSIS — G35 MULTIPLE SCLEROSIS (H): Primary | ICD-10-CM

## 2024-12-23 LAB — SCANNED LAB RESULT: NORMAL

## 2024-12-23 NOTE — TELEPHONE ENCOUNTER
[facility] Call Center    Phone Message    May a detailed message be left on voicemail: yes     Reason for Call: Other: Pt is requesting a call back from Dr. Palomo on regards to next steps with MTM Ref.  Pt stated he was not sure if MRI records have been received and reviewed by Dr. Palomo for a Parkinson's diagnosis.  Please reach out to Pt at: [phone]    Action Taken: Other: Neurology     Travel Screening: Not Applicable     Date of Service:

## 2024-12-23 NOTE — TELEPHONE ENCOUNTER
Brief Neurology Note:     I have reviewed the patient's imaging, including MR brain, C/T/L spine. There are lesions in his brain in right periventricular region and in his thoracic spine that do appear consistent with demyelination. Oligoclonal bands were positive in CSF. Given his presentation, imaging and CSF studies, the patient does meet criteria for relapsing remitting multiple sclerosis. As we discussed at our visit on 12/16/24, I have placed a MTM referral for initiation of treatment for multiple sclerosis, specifically considering treatment with dimethyl fumarate or ocrevus.     We will plan to follow up with the patient in clinic on March 10th, 2025.     The patient's case, imaging and lab results were discussed with Dr. Palomo who is in agreement with plan.     Mary Singer MD  Neurology Resident, PGY-2  
No

## 2024-12-23 NOTE — TELEPHONE ENCOUNTER
Dr. Singer,    I was not sure if this should go to you or Dr. Palomo but the pt  saw you on 12/16/2024 and was told that his MRI on the lower lumbar puncture was going to get reviewed and then be given an overall feedback. Per pt also stated that if MS is confirmed then he would get referred to the pharmacist to discuss treatment.    Images are in PACS.    Pt is expecting a call back from and per pt, if he cannot get to his phone, it is ok to leave a detailed message.        Please advise. Thank you.

## 2024-12-24 LAB — MAYO MISC RESULT: NORMAL

## 2024-12-26 LAB — ACE CSF-CCNC: 1.7 U/L

## 2024-12-28 ENCOUNTER — LAB (OUTPATIENT)
Dept: LAB | Facility: CLINIC | Age: 44
End: 2024-12-28
Payer: COMMERCIAL

## 2024-12-28 DIAGNOSIS — G35 MS (MULTIPLE SCLEROSIS) (H): ICD-10-CM

## 2024-12-28 LAB
BASOPHILS # BLD AUTO: 0 10E3/UL (ref 0–0.2)
BASOPHILS NFR BLD AUTO: 1 %
EOSINOPHIL # BLD AUTO: 0.1 10E3/UL (ref 0–0.7)
EOSINOPHIL NFR BLD AUTO: 1 %
ERYTHROCYTE [DISTWIDTH] IN BLOOD BY AUTOMATED COUNT: 11.9 % (ref 10–15)
HBV CORE AB SERPL QL IA: NONREACTIVE
HBV SURFACE AB SERPL IA-ACNC: 45.6 M[IU]/ML
HBV SURFACE AB SERPL IA-ACNC: REACTIVE M[IU]/ML
HBV SURFACE AG SERPL QL IA: NONREACTIVE
HCT VFR BLD AUTO: 41.6 % (ref 40–53)
HGB BLD-MCNC: 14 G/DL (ref 13.3–17.7)
IMM GRANULOCYTES # BLD: 0 10E3/UL
IMM GRANULOCYTES NFR BLD: 0 %
LYMPHOCYTES # BLD AUTO: 2.1 10E3/UL (ref 0.8–5.3)
LYMPHOCYTES NFR BLD AUTO: 29 %
MCH RBC QN AUTO: 30.7 PG (ref 26.5–33)
MCHC RBC AUTO-ENTMCNC: 33.7 G/DL (ref 31.5–36.5)
MCV RBC AUTO: 91 FL (ref 78–100)
MONOCYTES # BLD AUTO: 0.5 10E3/UL (ref 0–1.3)
MONOCYTES NFR BLD AUTO: 8 %
NEUTROPHILS # BLD AUTO: 4.3 10E3/UL (ref 1.6–8.3)
NEUTROPHILS NFR BLD AUTO: 61 %
PLATELET # BLD AUTO: 272 10E3/UL (ref 150–450)
RBC # BLD AUTO: 4.56 10E6/UL (ref 4.4–5.9)
WBC # BLD AUTO: 7 10E3/UL (ref 4–11)

## 2024-12-28 PROCEDURE — 86706 HEP B SURFACE ANTIBODY: CPT

## 2024-12-28 PROCEDURE — 36415 COLL VENOUS BLD VENIPUNCTURE: CPT

## 2024-12-28 PROCEDURE — 87340 HEPATITIS B SURFACE AG IA: CPT

## 2024-12-28 PROCEDURE — 86787 VARICELLA-ZOSTER ANTIBODY: CPT

## 2024-12-28 PROCEDURE — 85025 COMPLETE CBC W/AUTO DIFF WBC: CPT

## 2024-12-28 PROCEDURE — 86704 HEP B CORE ANTIBODY TOTAL: CPT

## 2024-12-28 PROCEDURE — 82784 ASSAY IGA/IGD/IGG/IGM EACH: CPT

## 2024-12-30 ENCOUNTER — TELEPHONE (OUTPATIENT)
Dept: PHARMACY | Facility: CLINIC | Age: 44
End: 2024-12-30
Payer: COMMERCIAL

## 2024-12-30 ENCOUNTER — HOME INFUSION (OUTPATIENT)
Dept: HOME HEALTH SERVICES | Facility: HOME HEALTH | Age: 44
End: 2024-12-30
Payer: COMMERCIAL

## 2024-12-30 ENCOUNTER — ENROLLMENT (OUTPATIENT)
Dept: HOME HEALTH SERVICES | Facility: HOME HEALTH | Age: 44
End: 2024-12-30
Payer: COMMERCIAL

## 2024-12-30 DIAGNOSIS — G35 MS (MULTIPLE SCLEROSIS) (H): Primary | ICD-10-CM

## 2024-12-30 LAB
IGA SERPL-MCNC: 299 MG/DL (ref 84–499)
IGG SERPL-MCNC: 1165 MG/DL (ref 610–1616)
IGM SERPL-MCNC: 88 MG/DL (ref 35–242)
VZV IGG SER QL IA: 14.8 S/CO
VZV IGG SER QL IA: POSITIVE

## 2024-12-30 RX ORDER — METHYLPREDNISOLONE SODIUM SUCCINATE 40 MG/ML
40 INJECTION INTRAMUSCULAR; INTRAVENOUS
Start: 2025-01-06

## 2024-12-30 RX ORDER — OCRELIZUMAB 300 MG/10ML
300 INJECTION INTRAVENOUS
Status: SHIPPED
Start: 2024-12-30

## 2024-12-30 RX ORDER — DIPHENHYDRAMINE HYDROCHLORIDE 50 MG/ML
25 INJECTION INTRAMUSCULAR; INTRAVENOUS
Start: 2025-01-06

## 2024-12-30 RX ORDER — ALBUTEROL SULFATE 90 UG/1
1-2 INHALANT RESPIRATORY (INHALATION)
Start: 2025-01-06

## 2024-12-30 RX ORDER — DIPHENHYDRAMINE HCL 50 MG
50 CAPSULE ORAL ONCE
OUTPATIENT
Start: 2025-01-06

## 2024-12-30 RX ORDER — HEPARIN SODIUM,PORCINE 10 UNIT/ML
5-20 VIAL (ML) INTRAVENOUS DAILY PRN
OUTPATIENT
Start: 2025-01-06

## 2024-12-30 RX ORDER — DIPHENHYDRAMINE HYDROCHLORIDE 50 MG/ML
50 INJECTION INTRAMUSCULAR; INTRAVENOUS
Start: 2025-01-06

## 2024-12-30 RX ORDER — EPINEPHRINE 1 MG/ML
0.3 INJECTION, SOLUTION, CONCENTRATE INTRAVENOUS EVERY 5 MIN PRN
OUTPATIENT
Start: 2025-01-06

## 2024-12-30 RX ORDER — HEPARIN SODIUM (PORCINE) LOCK FLUSH IV SOLN 100 UNIT/ML 100 UNIT/ML
5 SOLUTION INTRAVENOUS
OUTPATIENT
Start: 2025-01-06

## 2024-12-30 RX ORDER — ACETAMINOPHEN 325 MG/1
650 TABLET ORAL ONCE
OUTPATIENT
Start: 2025-01-06

## 2024-12-30 RX ORDER — METHYLPREDNISOLONE SODIUM SUCCINATE 125 MG/2ML
125 INJECTION INTRAMUSCULAR; INTRAVENOUS ONCE
OUTPATIENT
Start: 2025-01-06

## 2024-12-30 RX ORDER — ALBUTEROL SULFATE 0.83 MG/ML
2.5 SOLUTION RESPIRATORY (INHALATION)
OUTPATIENT
Start: 2025-01-06

## 2024-12-30 NOTE — TELEPHONE ENCOUNTER
Starting Ocrevus. Initial therapy plan created and ambulatory care prescription ordered. Pending orders to Dr. Singer to review and sign.     Shannon Michelle, PharmD, BCACP  Medication Therapy Management Pharmacist   [facility]

## 2025-01-02 ENCOUNTER — HOME INFUSION (OUTPATIENT)
Dept: HOME HEALTH SERVICES | Facility: HOME HEALTH | Age: 45
End: 2025-01-02
Payer: COMMERCIAL

## 2025-01-02 ENCOUNTER — TRANSFERRED RECORDS (OUTPATIENT)
Dept: HEALTH INFORMATION MANAGEMENT | Facility: CLINIC | Age: 45
End: 2025-01-02
Payer: COMMERCIAL

## 2025-01-02 LAB — SCANNED LAB RESULT: ABNORMAL

## 2025-01-02 RX ORDER — ACETAMINOPHEN 325 MG/1
650 TABLET ORAL
Qty: 50 TABLET | Refills: 0 | Status: DISPENSED | OUTPATIENT
Start: 2025-01-02 | End: 2025-12-30

## 2025-01-02 RX ORDER — DIPHENHYDRAMINE HCL 25 MG
50 CAPSULE ORAL
Qty: 50 CAPSULE | Refills: 0 | Status: DISPENSED | OUTPATIENT
Start: 2025-01-02 | End: 2025-12-30

## 2025-01-08 ENCOUNTER — HOME INFUSION BILLING (OUTPATIENT)
Dept: HOME HEALTH SERVICES | Facility: HOME HEALTH | Age: 45
End: 2025-01-08
Payer: COMMERCIAL

## 2025-01-08 ENCOUNTER — HOME CARE VISIT (OUTPATIENT)
Dept: HOME HEALTH SERVICES | Facility: HOME HEALTH | Age: 45
End: 2025-01-08
Payer: COMMERCIAL

## 2025-01-08 VITALS
HEART RATE: 69 BPM | BODY MASS INDEX: 24.55 KG/M2 | DIASTOLIC BLOOD PRESSURE: 62 MMHG | SYSTOLIC BLOOD PRESSURE: 108 MMHG | OXYGEN SATURATION: 97 % | TEMPERATURE: 98.1 F | WEIGHT: 181 LBS | RESPIRATION RATE: 16 BRPM

## 2025-01-08 PROCEDURE — 99601 HOME NFS VISIT <2 HRS: CPT | Mod: SS

## 2025-01-08 PROCEDURE — S9338 HIT IMMUNOTHERAPY DIEM: HCPCS

## 2025-01-08 PROCEDURE — S1015 IV TUBING EXTENSION SET: HCPCS

## 2025-01-08 PROCEDURE — E0781 EXTERNAL AMBULATORY INFUS PU: HCPCS | Mod: RR

## 2025-01-08 PROCEDURE — A4215 STERILE NEEDLE: HCPCS

## 2025-01-08 PROCEDURE — 99602 HOME NFS VISIT EACH ADDL HR: CPT | Mod: SS

## 2025-01-08 NOTE — PROGRESS NOTES
"Infusion Nursing Note:  Skilled nurse visit today for Ocrevus 300mg first dose for Eyad Venegas.   present during visit today: Not Applicable.    Pre-infusion Checklist:   Pre-infusion Checklist    Have you had any delayed reaction since last infusion?   No    Have you recently had an elevated temperature, fever, chills productive cough, coughing for 3 weeks or longer or hemoptysis, abnormal vital signs, night sweats, chest pain, or have you noticed a decrease in your appetite, or noted unexplained weight loss or fatigue?   No    Do you have any open wounds or new incisions?  No    Do you have any recent or upcoming hospitalizations, surgeries, or dental procedures?   No    Do you currently have or recently have had any signs of illness or infection or are you on any antibiotics?   No    Have you had any new, sudden , or worsening abdominal pain?   No    Have you or anyone in your household received a live vaccination in the past 4 weeks?   No    Have you recently been diagnosed with any new nervous system diseases or cancer diagnosis? (i.e., Multiple Sclerosis, Guillain [place], sezures, neurological changes) Are you receiving any form of radiation or chemotherapy?   No    Are you pregnant or breastfeeding, or do you have plans of pregnancy in the future?   No    Have you been having any signs of worsening depression or suicidal ideation?  No    Have there been any other new onset medical symptoms?  No    Did the patient answer \"YES\" to any of the questions above?  No    Will the patient receive a medication that has an order for infusion reaction management?  Yes, and all drugs and supplies are available and none have .    If ordered, has the patient taken pre-medications?  Yes    Plan:   Therapy is appropriate, will proceed with treatment.     Chemotherapy Treatment Conditions:   Not Applicable    Intravenous Access:  Peripheral IV placed.    Post Infusion Assessment:  Patient tolerated " infusion without incident.     Note: Start of Care and first dosing of Ocrevus, pt had his first MS sx in July 2024 and they included left foot numbness and intermittent tingling in his left thigh, Pt reports he was overwhelmed by the diagnosis but he is optimistic about the future and happy about the treatment options, MS does not interfere with his daily activities.  No problems with balance.  Reviewed Ocrevus teaching sheet and [employer] welcome folder and Infusion center policies         Saline administered (in mLs): 10ml NS flush with piv start, 10ml NS flush with Methylprednisolone dose, 20ml NS flush added to empty Ocrevus bag to flush tubing    Next Visit Plan:   1/22/25 0930      Joyce Lombardi RN 1/8/2025

## 2025-01-20 ENCOUNTER — HOME INFUSION (OUTPATIENT)
Dept: HOME HEALTH SERVICES | Facility: HOME HEALTH | Age: 45
End: 2025-01-20
Payer: COMMERCIAL

## 2025-01-22 ENCOUNTER — HOME CARE VISIT (OUTPATIENT)
Dept: HOME HEALTH SERVICES | Facility: HOME HEALTH | Age: 45
End: 2025-01-22
Payer: COMMERCIAL

## 2025-01-22 ENCOUNTER — HOME INFUSION BILLING (OUTPATIENT)
Dept: HOME HEALTH SERVICES | Facility: HOME HEALTH | Age: 45
End: 2025-01-22
Payer: COMMERCIAL

## 2025-01-22 VITALS
SYSTOLIC BLOOD PRESSURE: 112 MMHG | OXYGEN SATURATION: 97 % | TEMPERATURE: 98.7 F | RESPIRATION RATE: 18 BRPM | HEART RATE: 78 BPM | DIASTOLIC BLOOD PRESSURE: 53 MMHG

## 2025-01-22 PROCEDURE — 99602 HOME NFS VISIT EACH ADDL HR: CPT | Mod: SS

## 2025-01-22 PROCEDURE — A4215 STERILE NEEDLE: HCPCS

## 2025-01-22 PROCEDURE — S9338 HIT IMMUNOTHERAPY DIEM: HCPCS

## 2025-01-22 PROCEDURE — 99601 HOME NFS VISIT <2 HRS: CPT | Mod: SS

## 2025-01-22 PROCEDURE — S1015 IV TUBING EXTENSION SET: HCPCS

## 2025-01-22 ASSESSMENT — PAIN SCALES - GENERAL: PAINLEVEL: NO PAIN (0)

## 2025-01-22 NOTE — PROGRESS NOTES
Nursing Visit Note:  Nurse visit today for Ocrevus 300mg for Eyad Torres ANDREW Venegas.     present during visit today: Not Applicable.    Intravenous Access:  Peripheral IV placed.    Infusion Nursing Note:    Pre-infusion Checklist:   Have you had any delayed reaction since last infusion?   No     Have you recently had an elevated temperature, fever, chills productive cough, coughing for 3 weeks or longer or hemoptysis, abnormal vital signs, night sweats, chest pain, or have you noticed a decrease in your appetite, or noted unexplained weight loss or fatigue?   No     Do you have any open wounds or new incisions?  No     Do you have any recent or upcoming hospitalizations, surgeries, or dental procedures? Does not include esophagogastroduodenoscopy, colonoscopy, endoscopic retrograde cholangiopancreatography (ERCP), endoscopic ultrasound (EUS), dental procedures or joint aspiration/steroid injections.   No     Do you currently have or recently have had any signs of illness or infection or are you on any antibiotics?   No     Have you had any new, sudden, or worsening abdominal pain?   No     Have you or anyone in your household received a live vaccination in the past 4 weeks?   No     Have you recently been diagnosed with any new nervous system diseases or cancer diagnosis? (i.e., Multiple Sclerosis, Guillain Castaner, seizures, neurological changes) Are you receiving any form of radiation or chemotherapy?   No     Are you pregnant or breastfeeding, or do you have plans of pregnancy in the future?   No     Have you been having any signs of worsening depression or suicidal ideation?  No     Have you had any other new onset medical symptoms?  No    Entyvio/Ocrevus/Tyasabri only: Have you been having any new or worsening medical problems such as issues with thinking, eyesight, balance or strength that have persisted over several days?   No    Benlysta only: Have you been having any signs of worsening depression  "or suicidal ideations?    N/A    IVIG only: Have you had any new blood clots?  N/A    Did the patient answer \"YES\" to any of the questions above?  No     Will the patient receive a medication that has an order for infusion reaction management?  Yes, and all drugs and supplies are available and none have .     If ordered, has the patient taken pre-medications?  Yes    Plan:   Therapy is appropriate, will proceed with treatment.     Post Infusion Assessment:  Patient tolerated infusion without incident.     Note: Pt reports that he continues to have constant mild numbness of his left foot, intermittent tingling in his left thigh and a hot sensation when he bends his head.      Saline administered: 10ml NS flush with piv start, 10ml NS flush  after Methylprednisolone and 20ml NS added to empty Ocrevus bag to flush tubing    0956 Acetaminophen 650mg po and Diphenhydramine 50mg po  3716-1888 Methylprednisolone 125mg IVpush    Supply Check:   Does the patient have all the supplies they need for the next visit?  No    Next visit plan:   930  Joyce Lombardi RN 2025  "

## 2025-02-03 DIAGNOSIS — M54.12 CERVICAL RADICULOPATHY: Primary | ICD-10-CM

## 2025-02-03 NOTE — TELEPHONE ENCOUNTER
"/108 @   Patient states he had a sinus infection 10 days ago, and he is now off of Amoxicillin. [facility]: left ear was clogged. Given instructions for Flonase. He says he has a little fluid in that ear but no infection.   Reason for call= elevated blood pressure.   148/98 electronic, 140/80 manual 1st visit 10 days ago.  150/108 electronic, 140/98 manual today's visit.   Not currently on blood pressure medication. Last seen by PCP 1+1/2 yrs ago: he states he was told he had \"borderline high blood pressure\", and he was following a diet and exercise.   PCP [facility].   Triaged to a disposition of See PCP within 2 weeks. Call transferred to .    Peggy Guthrie RN Triage Nurse Advisor 4:32 PM 12/8/2021  Reason for Disposition    [1] Systolic BP  >= 130 OR Diastolic >= 80 AND [2] not taking BP medications    Additional Information    Negative: Difficult to awaken or acting confused (e.g., disoriented, slurred speech)    Negative: Severe difficulty breathing (e.g., struggling for each breath, speaks in single words)    Negative: [1] Weakness of the face, arm or leg on one side of the body AND [2] new onset    Negative: [1] Numbness (i.e., loss of sensation) of the face, arm or leg on one side of the body AND [2] new onset    Negative: [1] Chest pain lasts > 5 minutes AND [2] history of heart disease  (i.e., heart attack, bypass surgery, angina, angioplasty, CHF)    Negative: [1] Chest pain AND [2] took nitrogylcerin AND [3] pain was not relieved    Negative: Sounds like a life-threatening emergency to the triager    Negative: Symptom is main concern  (e.g., headache, chest pain)    Negative: Low blood pressure is main concern    Negative: [1] Systolic BP  >= 160 OR Diastolic >= 100 AND [2] cardiac or neurologic symptoms (e.g., chest pain, difficulty breathing, unsteady gait, blurred vision)    Negative: [1] Pregnant > 20 weeks (or postpartum < 6 weeks) AND [2] new hand or face " Spoke to patient, let her know that the 12th is the earliest I could get her in for an appointment however to document the discomfort she is feeling over the next week so she can report everything to .    swelling    Negative: [1] Pregnant > 20 weeks AND [2] BP Systolic BP  >= 140 OR Diastolic >= 90    Negative: [1] Systolic BP  >= 200 OR Diastolic >= 120  AND [2] having NO cardiac or neurologic symptoms    Negative: [1] Postpartum < 6 weeks AND [2] BP Systolic BP  >= 140 OR Diastolic >= 90    Negative: [1] Systolic BP  >= 180 OR Diastolic >= 110 AND [2] missed most recent dose of blood pressure medication    Negative: Systolic BP  >= 180 OR Diastolic >= 110    Negative: Ran out of BP medications    Negative: Systolic BP  >= 160 OR Diastolic >= 100    Negative: [1] Taking BP medications AND [2] feels is having side effects (e.g., impotence, cough, dizzy upon standing)    Negative: [1] Systolic BP  >= 130 OR Diastolic >= 80 AND [2] pregnant    Negative: [1] Systolic BP  >= 130 OR Diastolic >= 80 AND [2] taking BP medications    Protocols used: HIGH BLOOD PRESSURE-A-AH  COVID 19 Nurse Triage Plan/Patient Instructions    Please be aware that novel coronavirus (COVID-19) may be circulating in the community. If you develop symptoms such as fever, cough, or SOB or if you have concerns about the presence of another infection including coronavirus (COVID-19), please contact your health care provider or visit [email or web address].     Disposition/Instructions    In-Person Visit with provider recommended. Reference Visit Selection Guide.    Thank you for taking steps to prevent the spread of this virus.  o Limit your contact with others.  o Wear a simple mask to cover your cough.  o Wash your hands well and often.    Resources    [facility]: About COVID-19: [email or web address]    CDC: What to Do If You're Sick: www.cdc.gov/coronavirus/2019-ncov/about/steps-when-sick.html    CDC: Ending Home Isolation: www.cdc.gov/coronavirus/2019-ncov/hcp/disposition-in-home-patients.html     CDC: Caring for Someone: www.cdc.gov/coronavirus/2019-ncov/if-you-are-sick/care-for-someone.html     TIM: Interim Guidance  for Hospital Discharge to Home: www.[email or web address].[place]/diseases/coronavirus/hcp/hospdischarge.pdf    [facility] clinical trials (COVID-19 research studies): [email or web address]     Below are the COVID-19 hotlines at the [place] Department of Health ([facility]). Interpreters are available.   o For health questions: Call 044-514-4650 or 1-423.914.1774 (7 a.m. to 7 p.m.)  o For questions about schools and childcare: Call 823-844-5696 or 1-599.343.8549 (7 a.m. to 7 p.m.)

## 2025-02-03 NOTE — PROGRESS NOTES
See [employer] message from 2/2 and 2/3 for details regarding plan.     The patient was discussed with neurology attending, Dr. Palomo.     Mary Singer MD  Neurology Resident, PGY-2

## 2025-02-06 ENCOUNTER — THERAPY VISIT (OUTPATIENT)
Dept: PHYSICAL THERAPY | Facility: CLINIC | Age: 45
End: 2025-02-06
Payer: COMMERCIAL

## 2025-02-06 DIAGNOSIS — M54.12 CERVICAL RADICULOPATHY: ICD-10-CM

## 2025-02-06 NOTE — PROGRESS NOTES
PHYSICAL THERAPY EVALUATION  Type of Visit: Evaluation       Fall Risk Screen:  Fall screen completed by: PT  Have you fallen 2 or more times in the past year?: No  Have you fallen and had an injury in the past year?: No  Is patient a fall risk?: No    Subjective         Presenting condition or subjective complaint: pinched nerve in cervical spine c5-c6  Date of onset: 01/05/25    Relevant medical history: Multiple Sclerosis   Dates & types of surgery:      Prior diagnostic imaging/testing results: MRI; EMG     Prior therapy history for the same diagnosis, illness or injury: No      Prior Level of Function  Transfers:   Ambulation:   ADL:   IADL:     Living Environment  Social support: With a significant other or spouse   Type of home: House   Stairs to enter the home: Yes 3 Is there a railing: Yes     Ramp: No   Stairs inside the home: Yes 10 Is there a railing: Yes     Help at home: None  Equipment owned:       Employment: Yes technology sales  Hobbies/Interests: hiking,fishing    Patient goals for therapy: relieve pain    Physical Therapist Assessment    KEY PT FINDINGS:  1) Positive Spurling's and Distraction on RUE  2) Negative on myotomes and DTRs  3) Postural dysfunction - mild    Signs and symptoms are consistent with cervical radiculopathy.      Subjective History    Patient was referred by Mary Singer for cervical radiculopathy  Referring provider plan: I began experiencing sharp pain in my right trap the first week of January 2025. Over the course of the month, it progressed to a dull pain throughout my arm. Muscle tightness with a dull pain is especially present in my forearm . At times, I will experience shooting pain in my shoulder and bicep. The pain ranges from 3 to 7 out of 10.      I still have full range of motion, strength and feeling of the area.     PT Subjective:   Patient is a 44 year old male presenting to outpatient physical therapy with neck pain with radiating pain. This pain  started in the first week of January, noting some pain and tightness in the right upper trap and then it slowly moved down the right arm. Now it's not as prominent in the trap, but he notes intermittent sharp or shooting pain in his shoulder or tricep and then a dull ache feeling in his forearm. At times he gets pin and needle symptoms in his first three digits. Has a PMH of MS. Has a T3-T4 MS lesion, only noting that this has effected his left lower extremity sensation. Also notes he has Laremy sign. He is still trying to stay active with exercises, not noting any strength or coordination deficits. Resting arm on head helps alleviate symptoms. Feels tight in is right upper trap, but no significant loss of CROM.  Pain Level at Rest: 3/10  Pain Level with Use: 7/10  Pain Location: cervical spine, shoulder, and elbow  Pain Quality: Sharp, Shooting, and Tingling  Pain Frequency: intermittent  Pain is Worst: little bit more stiffness in hands in the morning  Pain is Exacerbated By: unsure  Pain is Relieved By: Bakody's sign  Pain Progression: Unchanged     PMH:   Patient Active Problem List   Diagnosis    Pain in joint involving ankle and foot    Hypercholesterolemia    MS (multiple sclerosis) (H)     Medications:   Current Outpatient Medications   Medication Sig Dispense Refill    acetaminophen (TYLENOL) 325 MG tablet Take 2 tablets (650 mg) by mouth every 14 days. Administer 30 minutes prior to infusion 50 tablet 0    atorvastatin (LIPITOR) 20 MG tablet Take 1 tablet (20 mg) by mouth at bedtime. Need to see doctor for further refill 90 tablet 0    cyanocobalamin (VITAMIN B-12) 1000 MCG tablet Take 1,000 mcg by mouth daily.      diphenhydrAMINE (BENADRYL) 25 MG capsule Take 2 capsules (50 mg) by mouth every 14 days. Administer 30 minutes prior to infusion 50 capsule 0    gabapentin (NEURONTIN) 400 MG capsule TAKE 1 CAPSULE (400 MG) BY MOUTH THREE TIMES A DAY.      methylPREDNISolone Na Suc (solu-MEDROL) 125 mg in  sodium chloride 0.9 % 12.5 mL injection Inject 125 mg over 5-10 minutes into the vein via push every 14 days. Administer 30 minutes prior to infusion 18450 mL 0    multivitamin, therapeutic (THERA-VIT) TABS tablet Take 1 tablet by mouth daily.      ocrelizumab (OCREVUS) 300 mg in sodium chloride 0.9 % 250 mL via CADD pump Infuse 300 mg over 2.5 hours into the vein every 14 days. (Day 1 & Day 15 only). Allow bag to come to room temperature.   Continuous rate: 30 mL/hr x30 min, 60 mL/hr x30 min, 90 mL/hr x30 min, 120 mL/hr x30 min, 150 mL/hr x30 min, 180 mL/hr until complete. Flush bag with 20 mL NS to flush tubing. Do not shake. 303638 mL 0    ocrelizumab (OCREVUS) 300 MG/10ML injection Inject 10 mLs (300 mg) into the vein every 14 days.      sodium chloride, PF, 0.9% PF flush Inject 10 mLs into the vein as needed for line flush. Flush IV before and after medication administration as directed and/or at least every 12 hours. 604576 mL 0    Vitamin D3 (VITAMIN D, CHOLECALCIFEROL,) 25 mcg (1000 units) tablet Take 50 mcg by mouth daily.       No current facility-administered medications for this visit.           Imaging: MR Cervical Spine w/o Contrast  Order: [id number]  Impression      Mild cervical disc and facet degeneration.    1.  No significant spinal canal narrowing at any level.    2.  Neural foraminal stenosis is greatest on the right at C5-6, moderate. There is also mild foraminal narrowing on the right at C3-4, on the left at C5-6, and on the right at C6-7.    3.  Normal cervical spinal cord.    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  Referral recommended? No  Additional Considerations: None     Patient Goals for Physical Therapy: Pain relief, no more pins and needles in fingers.          Objective    Objective Examination    Posture/Observation: Rounded shoulders, Forward head    Shoulder Screen: WNL    Thoracic Screen: Observed mild/mod postural kyphosis    Cervical AROM: (Major, Moderate, Minimal or Nil loss)  Movement Loss Igor Mod Min Nil Pain   Protrusion        Flexion    X + in right shoulder blade   Retraction        Extension   X  Upper cervical mobility mainly   Left Rotation    X    Right Rotation   X     Left Side Bending  X      Right Side bending  X      Quadrant Testing:      Palpation: WNL    Dermatomes:   Left Right   C1 Normal (light touch) Normal (light touch)   C2 Normal (light touch) Normal (light touch)   C3 Normal (light touch) Normal (light touch)   C4 Normal (light touch) Normal (light touch)   C5 Normal (light touch) Normal (light touch)   C6 Normal (light touch) Hypo (light touch)   C7 Normal (light touch) Normal (light touch)   C8 Normal (light touch) Normal (light touch)   T1 Normal (light touch) Normal (light touch)       Myotomes:   Left Right   C1-2 (Neck Flexion)     C3 (Neck Side Bend)      C4 (Shrug) 5 5   C5 (Deltoid) 5 5   C6 (Biceps) 5 5   C7 (Triceps) 5 5   C8 (Thumb Ext) 5 5   T1 (Intrinsics) 5 5       DTR S: WNL    CORD SIGNS: WNL    Special tests:    Left Right   Alar Ligament    Cervical Flexion-Rotation     Cervical Rot/Lateral Flex     Compression     Distraction Negative  Positive   Spurling s Negative  Positive   Thoracic Outlet Screen (Barbara, Adson)     Transverse Ligament     Vertebral Artery     Cotton Roll Test     Craniocervical Flexor Endurance Test     Mannheimer Test                    Neural Tension:    ULTT1 (median): (-)  ULTT2b (radial): (-)  ULTT3 (ulnar): (-)    Joint Mobility:    Atlantooccipital Joint   C0-C1     Atlantoaxial Joint (CFRT)    L ROT R ROT   C1-C2      Lower Cervical Spine (PIVM)    L SG R SG   C2-C3     C3-C4     C4-C5     C5-C6     C6-C7     C7-T1      Cervical Rotation Lateral Flexion Test (Constance)    L ROT R ROT   1st Rib           Strength Testing:  Resisted Isometrics C-Spine: N/A  Latissimus Dorsi MMT: Not Tested  Middle Trapezius/Rhomboids MMT: Not Tested  Lower Trapezius MMT: Not Tested    Flexibility: Not Tested    Craniocervical Flexion Endurance Test: N/A    Sensorimotor testing:   Cervical joint position sense (Norms <7.1 cm): N/A        Assessment & Plan   CLINICAL IMPRESSIONS  Medical Diagnosis:  (Cervical radiculopathy (M54.12))    Treatment Diagnosis: R Cervical Radiculopathy   Impression/Assessment: Patient is a 44 year old male with neck and radiating RUE complaints.  The following significant findings have been identified: Pain, Decreased ROM/flexibility, Decreased joint mobility, Impaired sensation, Impaired muscle performance, Decreased activity tolerance, and Impaired posture. These impairments interfere with their ability to perform work tasks and recreational activities as compared to previous level of function.     Clinical Decision Making (Complexity):  Clinical Presentation: Stable/Uncomplicated  Clinical Presentation Rationale: based on medical and personal factors listed in PT evaluation  Clinical Decision Making (Complexity): Low complexity    PLAN OF CARE  Treatment Interventions:  Interventions: Manual Therapy, Neuromuscular Re-education, Therapeutic Activity, Therapeutic Exercise, Self-Care/Home Management    Long Term Goals     PT Goal 1  Goal Identifier: LTG1  Goal Description: Patient will be able to report no numbness in right upper extremity for one week  Rationale: to maximize safety and independence with performance of ADLs and functional tasks;to maximize safety and independence within the home;to maximize safety and independence within the community;to maximize safety and independence with transportation;to maximize safety and independence with self cares  Target Date: 03/20/25  PT Goal 3  Goal Identifier: LTG2  Goal Description: Patient will report at least a 11.75 point improvement on the NDI  outcome measure  Rationale: to maximize safety and independence with performance of ADLs and functional tasks;to maximize safety and independence within the home;to maximize safety and independence within the community;to maximize safety and independence with transportation;to maximize safety and independence with self cares  Target Date: 03/20/25      Frequency of Treatment: 1x/wk  Duration of Treatment: 6 weeks    Recommended Referrals to Other Professionals:   Education Assessment:   Learner/Method: Patient    Risks and benefits of evaluation/treatment have been explained.   Patient/Family/caregiver agrees with Plan of Care.     Evaluation Time:     PT Eval, Low Complexity Minutes (35749): 25       Signing Clinician: Jagdeep Garay PT

## 2025-02-13 ENCOUNTER — THERAPY VISIT (OUTPATIENT)
Dept: PHYSICAL THERAPY | Facility: CLINIC | Age: 45
End: 2025-02-13
Payer: COMMERCIAL

## 2025-02-13 DIAGNOSIS — M54.12 CERVICAL RADICULOPATHY: Primary | ICD-10-CM

## 2025-02-18 ENCOUNTER — THERAPY VISIT (OUTPATIENT)
Dept: PHYSICAL THERAPY | Facility: CLINIC | Age: 45
End: 2025-02-18
Payer: COMMERCIAL

## 2025-02-18 DIAGNOSIS — M54.12 CERVICAL RADICULOPATHY: Primary | ICD-10-CM

## 2025-02-18 PROCEDURE — 97112 NEUROMUSCULAR REEDUCATION: CPT | Mod: GP

## 2025-02-18 PROCEDURE — 97110 THERAPEUTIC EXERCISES: CPT | Mod: GP

## 2025-02-18 PROCEDURE — 97140 MANUAL THERAPY 1/> REGIONS: CPT | Mod: GP

## 2025-02-19 DIAGNOSIS — E78.00 HYPERCHOLESTEREMIA: ICD-10-CM

## 2025-02-19 RX ORDER — ATORVASTATIN CALCIUM 20 MG/1
TABLET, FILM COATED ORAL
Qty: 90 TABLET | Refills: 2 | Status: SHIPPED | OUTPATIENT
Start: 2025-02-19

## 2025-02-25 ENCOUNTER — THERAPY VISIT (OUTPATIENT)
Dept: PHYSICAL THERAPY | Facility: CLINIC | Age: 45
End: 2025-02-25
Payer: COMMERCIAL

## 2025-02-25 DIAGNOSIS — M54.12 CERVICAL RADICULOPATHY: Primary | ICD-10-CM

## 2025-02-25 PROCEDURE — 97140 MANUAL THERAPY 1/> REGIONS: CPT | Mod: GP

## 2025-02-25 PROCEDURE — 97110 THERAPEUTIC EXERCISES: CPT | Mod: GP

## 2025-03-07 ENCOUNTER — TRANSFERRED RECORDS (OUTPATIENT)
Dept: HEALTH INFORMATION MANAGEMENT | Facility: CLINIC | Age: 45
End: 2025-03-07
Payer: COMMERCIAL

## 2025-03-10 ENCOUNTER — OFFICE VISIT (OUTPATIENT)
Dept: NEUROLOGY | Facility: CLINIC | Age: 45
End: 2025-03-10
Payer: COMMERCIAL

## 2025-03-10 VITALS — HEART RATE: 60 BPM | SYSTOLIC BLOOD PRESSURE: 130 MMHG | DIASTOLIC BLOOD PRESSURE: 80 MMHG

## 2025-03-10 DIAGNOSIS — Z51.81 THERAPEUTIC DRUG MONITORING: ICD-10-CM

## 2025-03-10 DIAGNOSIS — G35 MULTIPLE SCLEROSIS (H): Primary | ICD-10-CM

## 2025-03-10 DIAGNOSIS — E55.9 VITAMIN D DEFICIENCY: ICD-10-CM

## 2025-03-10 RX ORDER — TIZANIDINE 2 MG/1
2-4 TABLET ORAL 3 TIMES DAILY PRN
Qty: 30 TABLET | Refills: 1 | Status: SHIPPED | OUTPATIENT
Start: 2025-03-10

## 2025-03-10 NOTE — LETTER
3/10/2025      Eyad Venegas  [address]  [facility] [address]      Dear Colleague,    Thank you for referring your patient, Eyad Venegas, to the [facility] NEUROLOGY [facility]INIC [facility]. Please see a copy of my visit note below.      DEPARTMENT OF NEUROLOGY  Return visit  Patient Name:  Eyad Venegas  MRN:  [id number]    :  1980  Date of Clinic Visit:  March 10, 2025  Primary Care Provider:  Nighat Ruby        ASSESSMENT AND PLAN:  Eyad Venegas is a 44 year old male who is being followed in clinic for relapsing remitting multiple sclerosis.  He was started on Ocrevus and has been tolerating the infusion well.  He has not had any new symptoms.    He has had intermittent episodes of muscle cramping, and was interested in a as needed medication for longer episodes.    Plan:  - Tizanidine 2-4mg PRN for muscle cramping  - CBC with differential and CD19 B cell count to check for response to Ocrevus, vitamin D level  - Following above blood work, plan for repeat blood work at next infusion appointment  - continue ocrevus infusion, next scheduled for 25  - PT referral for multiple sclerosis  - MRI with brain, cervical spine, thoracic spine with and without contrast in 6 months    Follow up with me in 6 months.     Patient was seen and discussed with Dr. Palomo.    IVAN TREJO MD  Neurology Resident, PGY-2  [facility] Department of Neurology    _________________________________________    HPI:  Eyad Venegas is a 44 year old male who initially presented with abnormal sensation of his left leg, positive Lhermitte's sign, and report of abnormal lesion in thoracic spine and possible lesion in MRI brain, although we were not able to visualize these images today and only have a reports currently.  On exam, he has intact sensation, positive Lhermitte's sign, and slightly brisk but symmetric reflexes. LP showed positive oligoclonal bands. Given  imaging findings and positive oligoclonal banding, findings consistent with relapsing remitting multiple sclerosis, it was recommended that the patient start on disease modifying therapy. TK elected to start ocrevus.     Today, he overall has been doing well.  He has not had any new symptoms.  He continues to intermittently have symptoms in his left leg, but it has been stable.  He describes that he will get the slight wet feeling in his foot and then he gets a slight burning in his kneecap.  Additionally he will get an abnormal sensation where he feels a pulling on his calf, and it does not always last the same amount of time and is sometimes less intense and it feels like a pinch or twitching, gabapentin does not help this symptom.  There is not a clear trigger for the symptoms.  He uses gabapentin as needed.    The infusions went well, he did not have any side effects.  He has not been sick.    His neck pain has improved.  He does feel like the symptoms he had in his neck and going down his right arm more likely a pinched nerve and he has found benefit with PT.  He has not been having Lamere at [place] recently.    He has been reflecting prior to the onset of his symptoms in July, and thinks he may have had some other abnormal symptoms prior to that.  He shared that in 2018, he had a sensation where it felt like someone was on his chest and there is pain around his rib cage, this lasted for about 4 hours.  Then in June 2021, he had an electric shock sensation going down his spine when he would bend his neck, and this lasted for 4 days.  This occurred about a week after his camping trip that he had gone on that year.  In 2023 he had similar symptoms where he had an electric shock that felt like a buzzing when he would bend his neck and this lasted for about 3 days.  In June 2024, he felt like he may have had MS hug symptoms, where he had significant pain in his back in the middle of his rib cage and if it like he  was having trouble breathing.  He had to be sedentary for at least 4 hours, and then it started to improve.  Additionally he gets an occasional pain in his rib cage and he feels like he would have to stretched out.  Recently, he has not had any of the symptoms.    He and his wife recently just had a baby girl.  Mom and baby are doing well.  They also have a 5-year-old boy.    NEUROLOGIC EXAMINATION:   Of note, the majority of the examination was deferred as appointment was focused on answering questions.  Vitals: /80 (BP Location: Right arm, Patient Position: Sitting, Cuff Size: Adult Regular)   Pulse 60     General: Sitting in chair no acute distress    Neurologic Exam:  Mental status: The patient is awake, alert, attentive to conversation    Language: Speech is clear and fluent    Cranial nerves: Extraocular movements intact.  Face appears symmetric.  No dysarthria.    Motor/Strength: Moving all extremities independently and antigravity.    Sensation: Deferred    Reflexes: Deferred    Coordination: Deferred    Gait: deferred    INVESTIGATIONS:   Positive oligoclonal banding (3)  CDS1 panel negative       Component      Latest Ref Rn 12/28/2024  2:30 PM   WBC      4.0 - 11.0 10e3/uL 7.0    RBC Count      4.40 - 5.90 10e6/uL 4.56    Hemoglobin      13.3 - 17.7 g/dL 14.0    Hematocrit      40.0 - 53.0 % 41.6    MCV      78 - 100 fL 91    MCH      26.5 - 33.0 pg 30.7    MCHC      31.5 - 36.5 g/dL 33.7    RDW      10.0 - 15.0 % 11.9    Platelet Count      150 - 450 10e3/uL 272    % Neutrophils      % 61    % Lymphocytes      % 29    % Monocytes      % 8    % Eosinophils      % 1    % Basophils      % 1    % Immature Granulocytes      % 0    Absolute Neutrophils      1.6 - 8.3 10e3/uL 4.3    Absolute Lymphocytes      0.8 - 5.3 10e3/uL 2.1    Absolute Monocytes      0.0 - 1.3 10e3/uL 0.5    Absolute Eosinophils      0.0 - 0.7 10e3/uL 0.1    Absolute Basophils      0.0 - 0.2 10e3/uL 0.0    Absolute Immature  Granulocytes      <=0.4 10e3/uL 0.0    IGG      610 - 1,616 mg/dL 1,165    IGA      84 - 499 mg/dL 299    IGM      35 - 242 mg/dL 88    Hepatitis B Surface Antibody Reactive    Hepatitis B Surface Antibody Instrument Value      <8.5 m[IU]/mL 45.60    Varicella Zoster Virus Antibody IgG Interpretation Positive    Varicella Zoster Virus Antibody IgG Instrument Value      <1.00 S/CO 14.80    [place] Result    Hepatitis B Core Dia      Nonreactive  Nonreactive    Hep B Surface Agn      Nonreactive  Nonreactive    See Scanned Result NATHANIEL VIRUS AB INDEX REFLEX INHIBITION-Scanned !       Legend:  ! Abnormal    Attestation signed by Emy Palomo MD at 3/13/2025  6:47 AM:  I personally saw and evaluated Mr. Venegas with Dr. Trejo on the date of service.  I have reviewed the above documentation and agree with the findings and recommendations.     A total of 40 minutes were personally spent in the care of this patient on the date of service.     Emy Palomo MD on 3/13/2025 at 6:46 AM      Again, thank you for allowing me to participate in the care of your patient.        Sincerely,        IVAN TREJO MD    Electronically signed

## 2025-03-10 NOTE — PROGRESS NOTES
DEPARTMENT OF NEUROLOGY  Return visit  Patient Name:  Eyad Venegas  MRN:  [id number]    :  1980  Date of Clinic Visit:  March 10, 2025  Primary Care Provider:  Nighat Ruby        ASSESSMENT AND PLAN:  Eyad Venegas is a 44 year old male who is being followed in clinic for relapsing remitting multiple sclerosis.  He was started on Ocrevus and has been tolerating the infusion well.  He has not had any new symptoms.    He has had intermittent episodes of muscle cramping, and was interested in a as needed medication for longer episodes.    Plan:  - Tizanidine 2-4mg PRN for muscle cramping  - CBC with differential and CD19 B cell count to check for response to Ocrevus, vitamin D level  - Following above blood work, plan for repeat blood work at next infusion appointment  - continue ocrevus infusion, next scheduled for 25  - PT referral for multiple sclerosis  - MRI with brain, cervical spine, thoracic spine with and without contrast in 6 months    Follow up with me in 6 months.     Patient was seen and discussed with Dr. Palomo.    IVAN TREJO MD  Neurology Resident, PGY-2  [facility] Department of Neurology    _________________________________________    HPI:  Eyad Venegas is a 44 year old male who initially presented with abnormal sensation of his left leg, positive Lhermitte's sign, and report of abnormal lesion in thoracic spine and possible lesion in MRI brain, although we were not able to visualize these images today and only have a reports currently.  On exam, he has intact sensation, positive Lhermitte's sign, and slightly brisk but symmetric reflexes. LP showed positive oligoclonal bands. Given imaging findings and positive oligoclonal banding, findings consistent with relapsing remitting multiple sclerosis, it was recommended that the patient start on disease modifying therapy. TK elected to start ocrevus.     Today, he overall has been doing well.  He has  not had any new symptoms.  He continues to intermittently have symptoms in his left leg, but it has been stable.  He describes that he will get the slight wet feeling in his foot and then he gets a slight burning in his kneecap.  Additionally he will get an abnormal sensation where he feels a pulling on his calf, and it does not always last the same amount of time and is sometimes less intense and it feels like a pinch or twitching, gabapentin does not help this symptom.  There is not a clear trigger for the symptoms.  He uses gabapentin as needed.    The infusions went well, he did not have any side effects.  He has not been sick.    His neck pain has improved.  He does feel like the symptoms he had in his neck and going down his right arm more likely a pinched nerve and he has found benefit with PT.  He has not been having Lamere at [place] recently.    He has been reflecting prior to the onset of his symptoms in July, and thinks he may have had some other abnormal symptoms prior to that.  He shared that in 2018, he had a sensation where it felt like someone was on his chest and there is pain around his rib cage, this lasted for about 4 hours.  Then in June 2021, he had an electric shock sensation going down his spine when he would bend his neck, and this lasted for 4 days.  This occurred about a week after his camping trip that he had gone on that year.  In 2023 he had similar symptoms where he had an electric shock that felt like a buzzing when he would bend his neck and this lasted for about 3 days.  In June 2024, he felt like he may have had MS hug symptoms, where he had significant pain in his back in the middle of his rib cage and if it like he was having trouble breathing.  He had to be sedentary for at least 4 hours, and then it started to improve.  Additionally he gets an occasional pain in his rib cage and he feels like he would have to stretched out.  Recently, he has not had any of the symptoms.    He  and his wife recently just had a baby girl.  Mom and baby are doing well.  They also have a 5-year-old boy.    NEUROLOGIC EXAMINATION:   Of note, the majority of the examination was deferred as appointment was focused on answering questions.  Vitals: /80 (BP Location: Right arm, Patient Position: Sitting, Cuff Size: Adult Regular)   Pulse 60     General: Sitting in chair no acute distress    Neurologic Exam:  Mental status: The patient is awake, alert, attentive to conversation    Language: Speech is clear and fluent    Cranial nerves: Extraocular movements intact.  Face appears symmetric.  No dysarthria.    Motor/Strength: Moving all extremities independently and antigravity.    Sensation: Deferred    Reflexes: Deferred    Coordination: Deferred    Gait: deferred    INVESTIGATIONS:   Positive oligoclonal banding (3)  CDS1 panel negative       Component      Latest Ref [facility] 12/28/2024  2:30 PM   WBC      4.0 - 11.0 10e3/uL 7.0    RBC Count      4.40 - 5.90 10e6/uL 4.56    Hemoglobin      13.3 - 17.7 g/dL 14.0    Hematocrit      40.0 - 53.0 % 41.6    MCV      78 - 100 fL 91    MCH      26.5 - 33.0 pg 30.7    MCHC      31.5 - 36.5 g/dL 33.7    RDW      10.0 - 15.0 % 11.9    Platelet Count      150 - 450 10e3/uL 272    % Neutrophils      % 61    % Lymphocytes      % 29    % Monocytes      % 8    % Eosinophils      % 1    % Basophils      % 1    % Immature Granulocytes      % 0    Absolute Neutrophils      1.6 - 8.3 10e3/uL 4.3    Absolute Lymphocytes      0.8 - 5.3 10e3/uL 2.1    Absolute Monocytes      0.0 - 1.3 10e3/uL 0.5    Absolute Eosinophils      0.0 - 0.7 10e3/uL 0.1    Absolute Basophils      0.0 - 0.2 10e3/uL 0.0    Absolute Immature Granulocytes      <=0.4 10e3/uL 0.0    IGG      610 - 1,616 mg/dL 1,165    IGA      84 - 499 mg/dL 299    IGM      35 - 242 mg/dL 88    Hepatitis B Surface Antibody Reactive    Hepatitis B Surface Antibody Instrument Value      <8.5 m[IU]/mL 45.60    Varicella Zoster Virus  Antibody IgG Interpretation Positive    Varicella Zoster Virus Antibody IgG Instrument Value      <1.00 S/CO 14.80    [place] Result    Hepatitis B Core Dia      Nonreactive  Nonreactive    Hep B Surface Agn      Nonreactive  Nonreactive    See Scanned Result NATHANIEL VIRUS AB INDEX REFLEX INHIBITION-Scanned !       Legend:  ! Abnormal

## 2025-03-10 NOTE — PATIENT INSTRUCTIONS
Leg cramps   Tizanidine prn   Muscle relaxant - can cause sleepiness, so be cautious about first time use     Continue ocrevus     Blood work due to now to ensure appropriate response to ocrevus   Thereafter blood work will be done at your infusion appointment     I have recommended you visit with Adal Ferrell PT at [facility]     [facility]  [address]  [facility] [address]    Phone: [phone]  Fax: [phone]  Email: [email or web address]      Mri in 6 months     Follow up after MRI     Please monitor for the following symptoms:   - decreased vision from one eye, often associated with pain behind the eye  - double vision, often associated with walking difficulty  - numbness/tingling in an arm or leg that progresses over a few days  - weakness in an arm or leg    Symptoms of an MS relapse often progress over several hours or days.  People commonly experience intermittent numbness/tingling.  Only symptoms lasting more than 24 hours are concerning for a new relapse.

## 2025-03-12 ENCOUNTER — LAB (OUTPATIENT)
Dept: LAB | Facility: CLINIC | Age: 45
End: 2025-03-12
Payer: COMMERCIAL

## 2025-03-12 DIAGNOSIS — E55.9 VITAMIN D DEFICIENCY: ICD-10-CM

## 2025-03-12 DIAGNOSIS — Z51.81 THERAPEUTIC DRUG MONITORING: ICD-10-CM

## 2025-03-12 DIAGNOSIS — G35 MULTIPLE SCLEROSIS (H): ICD-10-CM

## 2025-03-12 LAB
BASOPHILS # BLD AUTO: 0 10E3/UL (ref 0–0.2)
BASOPHILS NFR BLD AUTO: 0 %
EOSINOPHIL # BLD AUTO: 0.1 10E3/UL (ref 0–0.7)
EOSINOPHIL NFR BLD AUTO: 2 %
ERYTHROCYTE [DISTWIDTH] IN BLOOD BY AUTOMATED COUNT: 12.3 % (ref 10–15)
HCT VFR BLD AUTO: 43.5 % (ref 40–53)
HGB BLD-MCNC: 14.7 G/DL (ref 13.3–17.7)
IMM GRANULOCYTES # BLD: 0 10E3/UL
IMM GRANULOCYTES NFR BLD: 0 %
LYMPHOCYTES # BLD AUTO: 1.6 10E3/UL (ref 0.8–5.3)
LYMPHOCYTES NFR BLD AUTO: 25 %
MCH RBC QN AUTO: 30.8 PG (ref 26.5–33)
MCHC RBC AUTO-ENTMCNC: 33.8 G/DL (ref 31.5–36.5)
MCV RBC AUTO: 91 FL (ref 78–100)
MONOCYTES # BLD AUTO: 0.6 10E3/UL (ref 0–1.3)
MONOCYTES NFR BLD AUTO: 9 %
NEUTROPHILS # BLD AUTO: 4.3 10E3/UL (ref 1.6–8.3)
NEUTROPHILS NFR BLD AUTO: 65 %
PLATELET # BLD AUTO: 260 10E3/UL (ref 150–450)
RBC # BLD AUTO: 4.77 10E6/UL (ref 4.4–5.9)
WBC # BLD AUTO: 6.6 10E3/UL (ref 4–11)

## 2025-03-12 PROCEDURE — 36415 COLL VENOUS BLD VENIPUNCTURE: CPT

## 2025-03-12 PROCEDURE — 82306 VITAMIN D 25 HYDROXY: CPT

## 2025-03-12 PROCEDURE — 85025 COMPLETE CBC W/AUTO DIFF WBC: CPT

## 2025-03-12 PROCEDURE — 86355 B CELLS TOTAL COUNT: CPT

## 2025-03-13 LAB
CD19 B CELL COMMENT: ABNORMAL
CD19 CELLS # BLD: 1 CELLS/UL (ref 107–698)
CD19 CELLS NFR BLD: <1 % (ref 6–27)
VIT D+METAB SERPL-MCNC: 30 NG/ML (ref 20–50)

## 2025-04-10 ENCOUNTER — DOCUMENTATION ONLY (OUTPATIENT)
Dept: OTHER | Facility: CLINIC | Age: 45
End: 2025-04-10
Payer: COMMERCIAL

## 2025-04-21 ENCOUNTER — MYC MEDICAL ADVICE (OUTPATIENT)
Dept: NEUROLOGY | Facility: CLINIC | Age: 45
End: 2025-04-21
Payer: COMMERCIAL

## 2025-04-21 DIAGNOSIS — G35 MULTIPLE SCLEROSIS (H): ICD-10-CM

## 2025-04-21 DIAGNOSIS — R35.0 URINARY FREQUENCY: Primary | ICD-10-CM

## 2025-04-29 ENCOUNTER — PATIENT OUTREACH (OUTPATIENT)
Dept: CARE COORDINATION | Facility: CLINIC | Age: 45
End: 2025-04-29
Payer: COMMERCIAL

## 2025-05-01 ENCOUNTER — PATIENT OUTREACH (OUTPATIENT)
Dept: CARE COORDINATION | Facility: CLINIC | Age: 45
End: 2025-05-01
Payer: COMMERCIAL

## 2025-05-05 NOTE — TELEPHONE ENCOUNTER
Action May 5, 2025 JTV 10:54 AM    Action Taken Css CALLED TOMAS. PATIENT CONFIRMED HE HAS NOT BEEN SEEN FOR THE DX. PATIENT CONFIRMED NO IMAGES AND NO LABS HAVE BEEN DONE. PATIENT GAVE VB OK TO UPDATE CHART.      MEDICAL RECORDS REQUEST   [facility]  Urology Clinic  [address] [place] [address]  PHONE: [phone]  Fax: [phone]        FUTURE VISIT INFORMATION                                                   Eyad Venegas, : 1980 scheduled for future visit at [facility] Urology Clinic    APPOINTMENT INFORMATION:  Date: 2025  Provider:  Alok Neil PA-C  Reason for Visit/Diagnosis: Urinary frequency    REFERRAL INFORMATION:  Referring provider:  Emy Palomo MD in  MS      RECORDS REQUESTED FOR VISIT                                                     NOTES  STATUS/DETAILS   NOTE from referring provider  YES, 03/10/2025 -- Emy Palomo MD in  [place]   OFFICE NOTE from other specialist  YES, 03/10/2025 -- Mary Singer MD @ [facility]   MEDICATION LIST  yes   LABS     URINALYSIS (UA)  no     PRE-VISIT CHECKLIST      Joint diagnostic appointment coordinated correctly          (ensure right order & amount of time) Yes   RECORD COLLECTION COMPLETE YES     
Patient/Caregiver provided printed discharge information.

## 2025-05-12 ENCOUNTER — VIRTUAL VISIT (OUTPATIENT)
Dept: PHARMACY | Facility: CLINIC | Age: 45
End: 2025-05-12
Attending: PSYCHIATRY & NEUROLOGY

## 2025-05-12 DIAGNOSIS — G35 MS (MULTIPLE SCLEROSIS) (H): Primary | ICD-10-CM

## 2025-05-12 RX ORDER — DIPHENHYDRAMINE HCL 50 MG
50 CAPSULE ORAL ONCE
OUTPATIENT
Start: 2025-07-09

## 2025-05-12 RX ORDER — METHYLPREDNISOLONE SODIUM SUCCINATE 40 MG/ML
40 INJECTION INTRAMUSCULAR; INTRAVENOUS
Start: 2025-07-09

## 2025-05-12 RX ORDER — HEPARIN SODIUM (PORCINE) LOCK FLUSH IV SOLN 100 UNIT/ML 100 UNIT/ML
5 SOLUTION INTRAVENOUS
OUTPATIENT
Start: 2025-07-09

## 2025-05-12 RX ORDER — ALBUTEROL SULFATE 0.83 MG/ML
2.5 SOLUTION RESPIRATORY (INHALATION)
OUTPATIENT
Start: 2025-07-09

## 2025-05-12 RX ORDER — DIPHENHYDRAMINE HYDROCHLORIDE 50 MG/ML
50 INJECTION, SOLUTION INTRAMUSCULAR; INTRAVENOUS
Start: 2025-07-09

## 2025-05-12 RX ORDER — ALBUTEROL SULFATE 90 UG/1
1-2 INHALANT RESPIRATORY (INHALATION)
Start: 2025-07-09

## 2025-05-12 RX ORDER — HEPARIN SODIUM,PORCINE 10 UNIT/ML
5-20 VIAL (ML) INTRAVENOUS DAILY PRN
OUTPATIENT
Start: 2025-07-09

## 2025-05-12 RX ORDER — DIPHENHYDRAMINE HYDROCHLORIDE 50 MG/ML
25 INJECTION, SOLUTION INTRAMUSCULAR; INTRAVENOUS
Start: 2025-07-09

## 2025-05-12 RX ORDER — EPINEPHRINE 1 MG/ML
0.3 INJECTION, SOLUTION, CONCENTRATE INTRAVENOUS EVERY 5 MIN PRN
OUTPATIENT
Start: 2025-07-09

## 2025-05-12 RX ORDER — OCRELIZUMAB 300 MG/10ML
600 INJECTION INTRAVENOUS
Status: SHIPPED
Start: 2025-05-12

## 2025-05-12 RX ORDER — ACETAMINOPHEN 325 MG/1
650 TABLET ORAL ONCE
OUTPATIENT
Start: 2025-07-09

## 2025-05-12 RX ORDER — METHYLPREDNISOLONE SODIUM SUCCINATE 125 MG/2ML
125 INJECTION INTRAMUSCULAR; INTRAVENOUS ONCE
OUTPATIENT
Start: 2025-07-09

## 2025-05-12 NOTE — PROGRESS NOTES
Medication Therapy Management (MTM) Encounter    ASSESSMENT:                            Medication Adherence/Access: No issues identified.    MS:   Patient tolerated initial starting dosing of Ocrevus infusions. He will not transition to receiving 600mg every 6 months. He will be due for labs at next infusion per last follow-up with Dr. Singer and Dr. Palomo. Therapy plan updated to reflect this dosing and labs added. Patient will also continue to infuse through [facility] with next infusion scheduled for 7/9/2025. Patient educated on the need to avoid live-vaccinations and the recommendation to get all indicated non-live vaccines about 3 months to 4 weeks. Recommend patient continue with all supportive medications.     PLAN:                            Continue with Ocrevus and now you will now transition to 600mg infusions every 6 months  Your next infusion is scheduled on 7/9/2025 through [facility]   Labs will be drawn on the day of your infusion.   It is best to get vaccines 3 months after your last infusion and up until 4 weeks prior to your next infusion     Follow-up:   Appointments in Next Year      May 16, 2025 2:45 PM  (Arrive by 2:30 PM)  New Urology with Alok Neil PA-C  [facility] Urology Clinic [facility] ) [phone]     Jul 09, 2025 8:30 AM  AIS Infusion Visit with [facility] INFUSION CHAIR 4  [facility] ([facility]) [phone]     Sep 09, 2025 7:00 AM  (Arrive by 6:30 AM)  MR BRAIN THORACIC CERVICAL SPINE WWO COMBO with [id number]  [facility] MRI [facility] ) [phone]     Sep 17, 2025 9:30 AM  (Arrive by 9:15 AM)  Return MS with Emy Palomo MD  [facility] Multiple Sclerosis Clinic [facility] ) [phone]     Dec 02, 2025 9:00 AM  Pharmacist  Visit with Shannon Michelle [facility] Neurology MTM ([facility] ) [phone]            SUBJECTIVE/OBJECTIVE:                          TK Venegas is a 44 year old male seen for a follow-up visit.       Reason for visit: Ocrevus Follow-up.    Allergies/ADRs: Reviewed in chart  Past Medical History: Reviewed in chart  Tobacco: He reports that he has never smoked. He has never been exposed to tobacco smoke. He has never used smokeless tobacco.  Alcohol: Less than 1 beverages / week  Medication Adherence/Access: no issues reported.      MS  -Ocrevus: completed starting dose of 300mg given two weeks apart for two doses. He received his infusions through [facility] on 1/8/25 and 1/22/2025. He went to the [facility] location and was satisfied going to this location. He will continue to go to the [facility]. He reports he felt slight itchiness in throat for a few minutes with the first infusion but then didn't experience this with the second infusions. Otherwise, he denies infusion reactions and denies any recent illnesses. He states that the steroid pre-medication made him feel very hungry. He is wondering about vaccine timing in regards to his infusion.     -tizanidine 2-4mg three times daily as needed - has not needed to take too many times, only if he has felt he has needed extra muscle spasm control after taking gabapentin   -gabapentin 400mg three times daily - denies side effects   -Vitamin D 5000 units daily - believes this is the dose     Pertinent labs  -CBC with diff completed (3/12/2025)  -CD19 B cell counter completed (3/12/2025)  -Igg completed (3/12/2025)    Today's Vitals: There were no vitals taken for this visit.  ----------------      I spent 10 minutes with this patient today. All changes were made via verbal approval with Mary Singer.     A summary of these recommendations was mailed to the patient.    Shannon Michelle, PharmD,  BCACP  Medication Therapy Management Pharmacist   [facility]      Telemedicine Visit Details  The patient's medications can be safely assessed via a telemedicine encounter.  Type of service:  Video Conference via [employer]  Originating Location (pt. Location): Home  Distant Location (provider location):  Off-site  Start Time: 12:29 PM  End Time: 12:39 PM     Medication Therapy Recommendations  No medication therapy recommendations to display

## 2025-05-12 NOTE — PATIENT INSTRUCTIONS
"Recommendations from today's MTM visit:                                                       Continue with Ocrevus and now you will now transition to 600mg infusions every 6 months  Your next infusion is scheduled on 7/9/2025 through [facility]   Labs will be drawn on the day of your infusion.   It is best to get vaccines 3 months after your last infusion and up until 4 weeks prior to your next infusion     Follow-up:   Appointments in Next Year      May 16, 2025 2:45 PM  (Arrive by 2:30 PM)  New Urology with Alok Niel PA-C  [facility]rology [facility] [place] ([facility] ) [phone]     Jul 09, 2025 8:30 AM  AIS Infusion Visit with [facility] INFUSION CHAIR 4  [facility] ([facility]) [phone]     Sep 09, 2025 7:00 AM  (Arrive by 6:30 AM)  MR BRAIN THORACIC CERVICAL SPINE WWO COMBO with [id number]  [facility] MRI [place] ([facility] ) [phone]     Sep 17, 2025 9:30 AM  (Arrive by 9:15 AM)  Return MS with Emy Palomo MD  [facility] Multiple Sclerosis Clinic [place] ([facility] ) [phone]     Dec 02, 2025 9:00 AM  Pharmacist Visit with Shannon Michelle RPH  [facility] Neurology [facility] ([facility] ) [phone]            It was great speaking with you today.  I value your experience and would be very thankful for your time in providing feedback in our clinic survey. In the next few days, you may receive an email or text message from [facility] [employer] with a link to a survey related to your  clinical pharmacist.\"     To schedule another MTM appointment, please call the clinic directly or you may call the MTM scheduling line at [phone] or toll-free at [phone].     My Clinical Pharmacist's contact information:                                                  "     Please feel free to contact me with any questions or concerns you have.      Shannon Michelle, PharmD, BCACP  Medication Therapy Management Pharmacist   [facility]

## 2025-05-16 ENCOUNTER — PRE VISIT (OUTPATIENT)
Dept: UROLOGY | Facility: CLINIC | Age: 45
End: 2025-05-16

## 2025-05-22 ENCOUNTER — THERAPY VISIT (OUTPATIENT)
Dept: PHYSICAL THERAPY | Facility: CLINIC | Age: 45
End: 2025-05-22
Attending: STUDENT IN AN ORGANIZED HEALTH CARE EDUCATION/TRAINING PROGRAM
Payer: COMMERCIAL

## 2025-05-22 DIAGNOSIS — R39.15 URINARY URGENCY: ICD-10-CM

## 2025-05-22 DIAGNOSIS — R35.0 URINARY FREQUENCY: ICD-10-CM

## 2025-05-22 NOTE — PROGRESS NOTES
PHYSICAL THERAPY EVALUATION  Type of Visit: Evaluation       Fall Risk Screen:  Have you fallen 2 or more times in the past year?: No  Have you fallen and had an injury in the past year?: No    Subjective  - Pt noticed that he began noticing urinary frequency in 2022, specifically frequency up to every 15 minutes. Pt was diagnosed with MS in the last year which has only occurred in the L leg. Pt started oxybutynin 4 days ago, potentially decrease urination in morning.         Presenting condition or subjective complaint: frequent urination  Date of onset: 05/16/25    Relevant medical history: Bladder or bowel problems; Multiple Sclerosis   Dates & types of surgery:      Prior diagnostic imaging/testing results:       Prior therapy history for the same diagnosis, illness or injury: No      Living Environment  Social support: With family members   Type of home: House   Stairs to enter the home: Yes 5 Is there a railing: Yes     Ramp: No   Stairs inside the home: Yes 18 Is there a railing: Yes     Help at home: None  Equipment owned:       Employment: Yes sales  Hobbies/Interests: hiking and camping    Patient goals for therapy: reduce need to urinate     Objective      PELVIC EVALUATION  ADDITIONAL HISTORY:  Sex assigned at birth: Male  Gender identity: Male    Pronouns: He/Him/His      Bladder History: Pt notes that his typically frequency in the morning is every couple hours, up to 3x per hour by the evening. Pt feels like he is emptying fully, typically stream will get weaker by the 3rd pee per hour. Dribbling more after urination towards the end of the day.   Feels bladder filling:    Triggers for feeling of inability to wait to go to the bathroom: No    How long can you wait to urinate: 1hour - pt feels like he has 2 settings either not having to go or urgent.   Gets up at night to urinate: Yes one or none   Can stop the flow of urine when urinating: Yes  Volume of urine usually released: Medium   Other issues:  Dribbling after urinating denies straining   Number of bladder infections in last 12 months:    Fluid intake per day: 100     - denies coffee, pop.   Medications taken for bladder: Yes oxybutinin   Activities causing urine leak:      Amount of urine typically leaked:    Pads used to help with leaking: No        Bowel History: Unremarkable over all.   Frequency of bowel movement: 1 to 3 times per day  Consistency of stool: Hard    Ignores the urge to defecate: Sometimes  Other bowel issues:    Length of time spent trying to have a bowel movement:      Sexual Function History: No complaints regarding sexual function.   Sexual orientation: Straight    Sexually active: Yes  Lubrication used: No No  Pelvic pain:      Pain or difficulty with orgasms/erection/ejaculation: No    State of menopause:    Hormone medications: No      Are you currently pregnant: No  Have you been diagnosed with pelvic prolapse or abdominal separation: No  Do you get regular exercise: Yes  I do this type of exercise: aerobic and anaerobic - stretch before, sometimes after   Have you tried pelvic floor strengthening exercises for 4 weeks: No  Do you have any history of trauma that is relevant to your care that you d like to share: No      Discussed reason for referral regarding pelvic health needs and external/internal pelvic floor muscle examination with patient/guardian.  Opportunity provided to ask questions and verbal consent for assessment and intervention was given.    POSTURE: Standing Posture: Rounded shoulders, Forward head    PELVIC EXAM  External Visual Inspection:  At rest: Normal  With voluntary pelvic floor contraction: Perineal elevation  Relaxation of PFM: Partial/delayed relaxation    External Digital Palpation per Perineum:   Levator ani: Unremarkable      ABDOMINAL ASSESSMENT - next visit     BIOFEEDBACK:  Position: Sit  Surface Electrodes: Perineal    Perianals:   Supine Baseline:      Seated Quick Flicks:      Seated  Endurance:    Final Seated Baseline:            PVR: Tested on Ultrasound today   Before urination: 187ml on bladder scan   After urination: Unable to visualize bladder.     Assessment & Plan   CLINICAL IMPRESSIONS  Medical Diagnosis: Urinary frequency  Urinary urgency    Treatment Diagnosis: Urinary frequency  Urinary urgency   Impression/Assessment: Patient is a 44 year old male with urinary frequency and urgency complaints.  The following significant findings have been identified: Decreased strength, Impaired muscle performance, Decreased activity tolerance, and Impaired posture. These impairments interfere with their ability to perform self care tasks, work tasks, and recreational activities as compared to previous level of function.     Clinical Decision Making (Complexity):  Clinical Presentation: Stable/Uncomplicated  Clinical Presentation Rationale: based on medical and personal factors listed in PT evaluation  Clinical Decision Making (Complexity): Low complexity    PLAN OF CARE  Treatment Interventions:  Modalities: Biofeedback  Interventions: Manual Therapy, Neuromuscular Re-education, Therapeutic Activity, Therapeutic Exercise, Self-Care/Home Management    Long Term Goals     PT Goal 1  Goal Identifier: Frequency  Goal Description: Pt will be able to urinate every 2 hours during the afternoons and evenings for less disruptive voiding schedule.  Rationale: to maximize safety and independence with self cares  Target Date: 08/19/25      Frequency of Treatment: 1x per week for 4 weeks, 2x per month for 2 months  Duration of Treatment: 12 weeks    Education Assessment:   Learner/Method: Patient;No Barriers to Learning    Risks and benefits of evaluation/treatment have been explained.   Patient/Family/caregiver agrees with Plan of Care.     Evaluation Time:     PT Eval, Low Complexity Minutes (39661): 26     Signing Clinician: Hawa Linares PT

## 2025-05-28 ENCOUNTER — MYC MEDICAL ADVICE (OUTPATIENT)
Dept: NEUROLOGY | Facility: CLINIC | Age: 45
End: 2025-05-28
Payer: COMMERCIAL

## 2025-05-28 DIAGNOSIS — G35 MULTIPLE SCLEROSIS (H): Primary | ICD-10-CM

## 2025-06-02 ENCOUNTER — PATIENT OUTREACH (OUTPATIENT)
Dept: CARE COORDINATION | Facility: CLINIC | Age: 45
End: 2025-06-02
Payer: COMMERCIAL

## 2025-06-03 ENCOUNTER — THERAPY VISIT (OUTPATIENT)
Dept: PHYSICAL THERAPY | Facility: CLINIC | Age: 45
End: 2025-06-03
Attending: STUDENT IN AN ORGANIZED HEALTH CARE EDUCATION/TRAINING PROGRAM
Payer: COMMERCIAL

## 2025-06-03 DIAGNOSIS — R35.0 URINARY FREQUENCY: Primary | ICD-10-CM

## 2025-06-03 DIAGNOSIS — R39.15 URINARY URGENCY: ICD-10-CM

## 2025-06-03 PROCEDURE — 97110 THERAPEUTIC EXERCISES: CPT | Mod: GP

## 2025-06-03 PROCEDURE — 97140 MANUAL THERAPY 1/> REGIONS: CPT | Mod: GP

## 2025-06-03 PROCEDURE — 97530 THERAPEUTIC ACTIVITIES: CPT | Mod: GP

## 2025-07-09 ENCOUNTER — HOME INFUSION BILLING (OUTPATIENT)
Dept: HOME HEALTH SERVICES | Facility: HOME HEALTH | Age: 45
End: 2025-07-09
Payer: COMMERCIAL

## 2025-07-09 ENCOUNTER — HOME CARE VISIT (OUTPATIENT)
Dept: HOME HEALTH SERVICES | Facility: HOME HEALTH | Age: 45
End: 2025-07-09
Payer: COMMERCIAL

## 2025-07-09 ENCOUNTER — LAB REQUISITION (OUTPATIENT)
Dept: LAB | Facility: CLINIC | Age: 45
End: 2025-07-09
Payer: COMMERCIAL

## 2025-07-09 VITALS
TEMPERATURE: 97.7 F | DIASTOLIC BLOOD PRESSURE: 62 MMHG | BODY MASS INDEX: 24.82 KG/M2 | HEART RATE: 76 BPM | SYSTOLIC BLOOD PRESSURE: 122 MMHG | RESPIRATION RATE: 16 BRPM | OXYGEN SATURATION: 98 % | WEIGHT: 183 LBS

## 2025-07-09 DIAGNOSIS — G35 MULTIPLE SCLEROSIS (H): ICD-10-CM

## 2025-07-09 LAB
BASOPHILS # BLD AUTO: 0.1 10E3/UL (ref 0–0.2)
BASOPHILS NFR BLD AUTO: 1 %
CD19 B CELL COMMENT: ABNORMAL
CD19 CELLS # BLD: 1 CELLS/UL (ref 107–698)
CD19 CELLS NFR BLD: <1 % (ref 6–27)
EOSINOPHIL # BLD AUTO: 0.4 10E3/UL (ref 0–0.7)
EOSINOPHIL NFR BLD AUTO: 7 %
ERYTHROCYTE [DISTWIDTH] IN BLOOD BY AUTOMATED COUNT: 12.5 % (ref 10–15)
HCT VFR BLD AUTO: 43 % (ref 40–53)
HGB BLD-MCNC: 14.8 G/DL (ref 13.3–17.7)
IMM GRANULOCYTES # BLD: 0 10E3/UL
IMM GRANULOCYTES NFR BLD: 1 %
LYMPHOCYTES # BLD AUTO: 1.5 10E3/UL (ref 0.8–5.3)
LYMPHOCYTES NFR BLD AUTO: 26 %
MCH RBC QN AUTO: 31.6 PG (ref 26.5–33)
MCHC RBC AUTO-ENTMCNC: 34.4 G/DL (ref 31.5–36.5)
MCV RBC AUTO: 92 FL (ref 78–100)
MONOCYTES # BLD AUTO: 0.5 10E3/UL (ref 0–1.3)
MONOCYTES NFR BLD AUTO: 9 %
NEUTROPHILS # BLD AUTO: 3.4 10E3/UL (ref 1.6–8.3)
NEUTROPHILS NFR BLD AUTO: 57 %
NRBC # BLD AUTO: 0 10E3/UL
NRBC BLD AUTO-RTO: 0 /100
PLATELET # BLD AUTO: 252 10E3/UL (ref 150–450)
RBC # BLD AUTO: 4.69 10E6/UL (ref 4.4–5.9)
WBC # BLD AUTO: 5.9 10E3/UL (ref 4–11)

## 2025-07-09 PROCEDURE — 85025 COMPLETE CBC W/AUTO DIFF WBC: CPT | Performed by: PSYCHIATRY & NEUROLOGY

## 2025-07-09 PROCEDURE — 99602 HOME NFS VISIT EACH ADDL HR: CPT | Mod: SS

## 2025-07-09 PROCEDURE — 86355 B CELLS TOTAL COUNT: CPT | Performed by: PSYCHIATRY & NEUROLOGY

## 2025-07-09 PROCEDURE — 99601 HOME NFS VISIT <2 HRS: CPT | Mod: SS

## 2025-07-09 PROCEDURE — 82784 ASSAY IGA/IGD/IGG/IGM EACH: CPT | Performed by: PSYCHIATRY & NEUROLOGY

## 2025-07-09 ASSESSMENT — PAIN SCALES - GENERAL: PAINLEVEL: NO PAIN (0)

## 2025-07-09 NOTE — PROGRESS NOTES
Nursing Visit Note:  Nurse visit today for Ocrevus 600mg for Eyad Torres ANDREW Venegas.     present during visit today: Not Applicable.    Intravenous Access:  Peripheral IV placed.    Infusion Nursing Note:    Pre-infusion Checklist:   Have you had any delayed reaction since last infusion?   No     Have you recently had an elevated temperature, fever, chills productive cough, coughing for 3 weeks or longer or hemoptysis, abnormal vital signs, night sweats, chest pain, or have you noticed a decrease in your appetite, or noted unexplained weight loss or fatigue?   No     Do you have any open wounds or new incisions?  No     Do you have any recent or upcoming hospitalizations, surgeries, or dental procedures? Does not include esophagogastroduodenoscopy, colonoscopy, endoscopic retrograde cholangiopancreatography (ERCP), endoscopic ultrasound (EUS), dental procedures or joint aspiration/steroid injections.   No     Do you currently have or recently have had any signs of illness or infection or are you on any antibiotics?   No     Have you had any new, sudden, or worsening abdominal pain?   No     Have you or anyone in your household received a live vaccination in the past 4 weeks?   No     Have you recently been diagnosed with any new nervous system diseases or cancer diagnosis? (i.e., Multiple Sclerosis, Guillain [place], seizures, neurological changes) Are you receiving any form of radiation or chemotherapy?   No     Are you pregnant or breastfeeding, or do you have plans of pregnancy in the future?   No     Have you been having any signs of worsening depression or suicidal ideation?  No     Have you had any other new onset medical symptoms?  No    Entyvio/Ocrevus/Tyasabri only: Have you been having any new or worsening medical problems such as issues with thinking, eyesight, balance or strength that have persisted over several days?   N/A    Benlysta only: Have you been having any signs of worsening depression  "or suicidal ideations?    N/A    IVIG only: Have you had any new blood clots?  N/A    Did the patient answer \"YES\" to any of the questions above?  No     Will the patient receive a medication that has an order for infusion reaction management?  Yes, and all drugs and supplies are available and none have .     If ordered, has the patient taken pre-medications?  Yes    907 Acetaminophen 650mg po and Diphenhydramine 50mg po  [id number] Methylprednisolone 125mg Ivpush    Lab draw at 930 sent to [facility]  Tracking is 585 sent STAT  CD 19 Bcell count, IGG and Cbc/d      Plan:   Therapy is appropriate, will proceed with treatment.     Post Infusion Assessment:  Patient tolerated infusion without incident.     Note: Pt reports hat his baseline MS sx include reduced sensation in left foot, intermittent cramping of left hamstring, calf, right calf and urine frequency.  Pt states that his sx are worse when the weather is hot.       Saline administered: 50 (ml)      Next visit plan: 830    Joyce Lombardi RN 2025  "

## 2025-07-10 LAB — IGG SERPL-MCNC: 1119 MG/DL (ref 610–1616)

## 2025-07-14 ENCOUNTER — MYC REFILL (OUTPATIENT)
Dept: NEUROLOGY | Facility: CLINIC | Age: 45
End: 2025-07-14
Payer: COMMERCIAL

## 2025-07-14 DIAGNOSIS — G35 MULTIPLE SCLEROSIS (H): ICD-10-CM

## 2025-07-15 RX ORDER — TIZANIDINE 2 MG/1
2-4 TABLET ORAL 3 TIMES DAILY PRN
Qty: 180 TABLET | Refills: 1 | Status: SHIPPED | OUTPATIENT
Start: 2025-07-15

## 2025-07-15 NOTE — TELEPHONE ENCOUNTER
Pending Prescriptions:                       Disp   Refills    tiZANidine (ZANAFLEX) 2 MG tablet         180 ta*1            Sig: Take 1-2 tablets (2-4 mg) by mouth 3 times daily           as needed for muscle spasms.    Next appt:    9/17/2025 9:30 AM (Arrive by 9:15 AM) Emy Palomo MD [facility] Multiple Sclerosis [facility]         Medication T'd for review and signature          SHAHEED Tovar on 7/15/2025 at 2:25 PM

## (undated) RX ORDER — LIDOCAINE HYDROCHLORIDE 10 MG/ML
INJECTION, SOLUTION EPIDURAL; INFILTRATION; INTRACAUDAL; PERINEURAL
Status: DISPENSED
Start: 2024-12-19